# Patient Record
Sex: FEMALE | Race: ASIAN | Employment: UNEMPLOYED | URBAN - METROPOLITAN AREA
[De-identification: names, ages, dates, MRNs, and addresses within clinical notes are randomized per-mention and may not be internally consistent; named-entity substitution may affect disease eponyms.]

---

## 2017-09-24 ENCOUNTER — HOSPITAL ENCOUNTER (INPATIENT)
Age: 47
LOS: 1 days | Discharge: HOME | DRG: 744 | End: 2017-09-26
Attending: EMERGENCY MEDICINE | Admitting: OBSTETRICS & GYNECOLOGY
Payer: SUBSIDIZED

## 2017-09-24 DIAGNOSIS — N93.9 ABNORMAL UTERINE BLEEDING: ICD-10-CM

## 2017-09-24 DIAGNOSIS — I95.9 HYPOTENSION, UNSPECIFIED HYPOTENSION TYPE: ICD-10-CM

## 2017-09-24 DIAGNOSIS — R55 SYNCOPE, UNSPECIFIED SYNCOPE TYPE: ICD-10-CM

## 2017-09-24 DIAGNOSIS — D64.9 SEVERE ANEMIA: Primary | ICD-10-CM

## 2017-09-24 DIAGNOSIS — R57.9 SHOCK (HCC): ICD-10-CM

## 2017-09-24 LAB
ERYTHROCYTE [DISTWIDTH] IN BLOOD BY AUTOMATED COUNT: 18 % (ref 11.6–14.5)
HCT VFR BLD AUTO: 15.4 % (ref 35–45)
HGB BLD-MCNC: 4.8 G/DL (ref 12–16)
MCH RBC QN AUTO: 20.6 PG (ref 24–34)
MCHC RBC AUTO-ENTMCNC: 31.2 G/DL (ref 31–37)
MCV RBC AUTO: 66.1 FL (ref 74–97)
PLATELET # BLD AUTO: 479 K/UL (ref 135–420)
PMV BLD AUTO: 8.7 FL (ref 9.2–11.8)
RBC # BLD AUTO: 2.33 M/UL (ref 4.2–5.3)
WBC # BLD AUTO: 16.1 K/UL (ref 4.6–13.2)

## 2017-09-24 PROCEDURE — 86900 BLOOD TYPING SEROLOGIC ABO: CPT

## 2017-09-24 PROCEDURE — 86920 COMPATIBILITY TEST SPIN: CPT

## 2017-09-24 PROCEDURE — 96360 HYDRATION IV INFUSION INIT: CPT

## 2017-09-24 PROCEDURE — 77030013169 SET IV BLD ICUM -A

## 2017-09-24 PROCEDURE — 80048 BASIC METABOLIC PNL TOTAL CA: CPT | Performed by: EMERGENCY MEDICINE

## 2017-09-24 PROCEDURE — 96361 HYDRATE IV INFUSION ADD-ON: CPT

## 2017-09-24 PROCEDURE — 99285 EMERGENCY DEPT VISIT HI MDM: CPT

## 2017-09-24 PROCEDURE — 85027 COMPLETE CBC AUTOMATED: CPT

## 2017-09-24 RX ORDER — LANOLIN ALCOHOL/MO/W.PET/CERES
CREAM (GRAM) TOPICAL
COMMUNITY
End: 2018-07-23

## 2017-09-24 RX ORDER — PROGESTERONE 200 MG/1
200 CAPSULE ORAL DAILY
COMMUNITY
End: 2017-09-26

## 2017-09-24 RX ORDER — ASPIRIN 81 MG
TABLET, DELAYED RELEASE (ENTERIC COATED) ORAL
COMMUNITY
End: 2017-11-09

## 2017-09-24 NOTE — Clinical Note
Status[de-identified] Inpatient [101] Type of Bed: Medical [8] Inpatient Hospitalization Certified Necessary for the Following Reasons: Patient receiving treatment that can only be provided in an inpatient setting (further clarification in H&P documentation) Admitting Diagnosis: Acute blood loss anemia [865687] Admitting Physician: Lv Gallego Attending Physician: Lv Gallego Estimated Length of Stay: 2 Midnights Discharge Plan[de-identified] Home with Office Follow-up

## 2017-09-24 NOTE — IP AVS SNAPSHOT
Delaney Ortiz 
 
 
 [address] [address] 
[phone] Patient: Yaritza Patricia MRN: [id number] You are allergic to the following No active allergies Recent Documentation Height Weight BMI Smoking Status 1.524 m 43.1 kg 18.55 kg/m2 Never Smoker Unresulted Labs Order Current Status CULTURE, URINE Preliminary result Emergency Contacts Name Discharge Info Relation Home Work Mobile No,One DECLINED CAREGIVER [4] Unknown [9]   [phone] About your hospitalization You were admitted on:  2017 You last received care in the:  [address] [place] [address] You were discharged on:  2017 Unit phone number:  [phone] Why you were hospitalized Your primary diagnosis was:  Cervical Mass Your diagnoses also included:  Vaginal Bleeding Providers Seen During Your Hospitalizations Provider Role Specialty Primary office phone Meng Keith MD Attending Provider Emergency Medicine [phone] Rica Bourgeois MD Attending Provider Obstetrics & Gynecology [phone] Mary Olivera MD Attending Provider Gynecologic Oncology [phone] Your Primary Care Physician (PCP) Primary Care Physician Office Phone Office Fax OTHER, PHYS ** None ** ** None ** Follow-up Information Follow up With Details Comments Contact Info Shama Hilton MD   Patient can only remember the practice name and not the physician Mary Olivera MD Schedule an appointment as soon as possible for a visit in 1 week EXT: 234; PET/CT scan is also ordered to be done prior to follow up. Central scheduling will call to schedule this test. [address] [facility] 
[phone] Your Appointments 2017 CYSTOSCOPY, CERVIX CONE BIOPSY with Mary Olivera MD  
 [facility] [place] [address] [phone] Current Discharge Medication List  
  
START taking these medications Dose & Instructions Dispensing Information Comments Morning Noon Evening Bedtime HYDROmorphone 2 mg tablet Commonly known as:  DILAUDID Your next dose is:  9/26/2017 Dose:  2-4 mg Take 1-2 Tabs by mouth every four (4) hours as needed. Max Daily Amount: 24 mg. Indications: neoplasm related pain Quantity:  60 Tab Refills:  0 CONTINUE these medications which have NOT CHANGED Dose & Instructions Dispensing Information Comments Morning Noon Evening Bedtime  mg Tab Generic drug:  docusate sodium Take  by mouth. Refills:  0  
     
   
   
   
  
 ferrous sulfate 325 mg (65 mg iron) tablet Take  by mouth Daily (before breakfast). Refills:  0 STOP taking these medications   
 progesterone 200 mg capsule Commonly known as:  PROMETRIUM Where to Get Your Medications Information on where to get these meds will be given to you by the nurse or doctor. ! Ask your nurse or doctor about these medications HYDROmorphone 2 mg tablet Discharge Instructions DISCHARGE SUMMARY from Nurse The following personal items are in your possession at time of discharge: 
 
Dental Appliances: None Visual Aid: None Home Medications: None Jewelry: None Clothing: None Other Valuables: None PATIENT INSTRUCTIONS: 
 
 
F-face looks uneven A-arms unable to move or move unevenly S-speech slurred or non-existent T-time-call 911 as soon as signs and symptoms begin-DO NOT go Back to bed or wait to see if you get better-TIME IS BRAIN. Warning Signs of HEART ATTACK Call 911 if you have these symptoms: 
? Chest discomfort. Most heart attacks involve discomfort in the center of the chest that lasts more than a few minutes, or that goes away and comes back. It can feel like uncomfortable pressure, squeezing, fullness, or pain. ? Discomfort in other areas of the upper body. Symptoms can include pain or discomfort in one or both arms, the back, neck, jaw, or stomach. ? Shortness of breath with or without chest discomfort. ? Other signs may include breaking out in a cold sweat, nausea, or lightheadedness. Don't wait more than five minutes to call [address]! Fast action can save your life. Calling 911 is almost always the fastest way to get lifesaving treatment. Emergency Medical Services staff can begin treatment when they arrive  up to an hour sooner than if someone gets to the hospital by car. The discharge information has been reviewed with the patient and spouse. The patient and spouse verbalized understanding. Discharge medications reviewed with the patient and spouse and appropriate educational materials and side effects teaching were provided. Vaginal Bleeding in Nonpregnant Women: Care Instructions Your Care Instructions Many women have bleeding or spotting between periods. Lots of things can cause it. You may bleed because of hormone problems, stress, or ovulation. Fibroids and IUDs (intrauterine devices) can also cause bleeding. If your bleeding or spotting is caused by one of these things and is not heavy or doesn't happen often, you probably don't need to worry. But in rare cases, infection, cancer, or other serious conditions can cause bleeding. So you may need more tests to find the cause of your bleeding. The doctor has checked you carefully, but problems can develop later. If you notice any problems or new symptoms, get medical treatment right away. Follow-up care is a key part of your treatment and safety. Be sure to make and go to all appointments, and call your doctor if you are having problems. It's also a good idea to know your test results and keep a list of the medicines you take. How can you care for yourself at home? · Take pain medicines exactly as directed. ¨ If the doctor gave you a prescription medicine for pain, take it as prescribed. ¨ If you are not taking a prescription pain medicine, ask your doctor if you can take an over-the-counter medicine. Do not take aspirin, which may make bleeding worse. · If your doctor prescribed birth control pills for your bleeding, take them as directed. · Eat foods that are high in iron and vitamin C. Foods high in iron include red meat, shellfish, eggs, beans, and leafy green vegetables. Foods high in vitamin C include citrus fruits, tomatoes, and broccoli. Ask your doctor if you need to take iron pills or a multivitamin. · Ask your doctor when it is okay to have sex. When should you call for help? Call 911 anytime you think you may need emergency care. For example, call if: 
· You passed out (lost consciousness). · You have sudden, severe pain in your belly or pelvis. Call your doctor now or seek immediate medical care if: 
· You have severe vaginal bleeding. You are soaking through your usual pads or tampons each hour for 2 or more hours. · You are dizzy or lightheaded or you feel like you may faint. · You have new belly or pelvic pain. · You have a fever. · Your bleeding gets worse. Watch closely for changes in your health, and be sure to contact your doctor if: 
· You have new or worse vaginal discharge. · You do not get better as expected. Where can you learn more? Go to http://hilary-thelma.info/. Enter H894 in the search box to learn more about \"Vaginal Bleeding in Nonpregnant Women: Care Instructions. \" Current as of: October 13, 2016 Content Version: 11.3 © 8215-9129 [employer]. Care instructions adapted under license by [employer] (which disclaims liability or warranty for this information). If you have questions about a medical condition or this instruction, always ask your healthcare professional. [address] any warranty or liability for your use of this information. Anemia: Care Instructions Your Care Instructions Anemia is a low level of red blood cells, which carry oxygen throughout your body. Many things can cause anemia. Lack of iron is one of the most common causes. Your body needs iron to make hemoglobin, a substance in red blood cells that carries oxygen from the lungs to your body's cells. Without enough iron, the body produces fewer and smaller red blood cells. As a result, your body's cells do not get enough oxygen, and you feel tired and weak. And you may have trouble concentrating. Bleeding is the most common cause of a lack of iron. You may have heavy menstrual bleeding or bleeding caused by conditions such as ulcers, hemorrhoids, or cancer. Regular use of aspirin or other anti-inflammatory medicines (such as ibuprofen) also can cause bleeding in some people. A lack of iron in your diet also can cause anemia, especially at times when the body needs more iron, such as during pregnancy, infancy, and the teen years. Your doctor may have prescribed iron pills. It may take several months of treatment for your iron levels to return to normal. Your doctor also may suggest that you eat foods that are rich in iron, such as meat and beans. There are many other causes of anemia. It is not always due to a lack of iron. Finding the specific cause of your anemia will help your doctor find the right treatment for you. Follow-up care is a key part of your treatment and safety. Be sure to make and go to all appointments, and call your doctor if you are having problems. It's also a good idea to know your test results and keep a list of the medicines you take. How can you care for yourself at home? · Take your medicines exactly as prescribed. Call your doctor if you think you are having a problem with your medicine. · If your doctor recommends iron pills, take them as directed: ¨ Try to take the pills on an empty stomach about 1 hour before or 2 hours after meals. But you may need to take iron with food to avoid an upset stomach. ¨ Do not take antacids or drink milk or caffeine drinks (such as coffee, tea, or cola) at the same time or within 2 hours of the time that you take your iron. They can make it hard for your body to absorb the iron. ¨ Vitamin C (from food or supplements) helps your body absorb iron. Try taking iron pills with a glass of orange juice or some other food that is high in vitamin C, such as citrus fruits. ¨ Iron pills may cause stomach problems, such as heartburn, nausea, diarrhea, constipation, and cramps. Be sure to drink plenty of fluids, and include fruits, vegetables, and fiber in your diet each day. Iron pills often make your bowel movements dark or green. ¨ If you forget to take an iron pill, do not take a double dose of iron the next time you take a pill. ¨ Keep iron pills out of the reach of small children. An overdose of iron can be very dangerous. · Follow your doctor's advice about eating iron-rich foods. These include red meat, shellfish, poultry, eggs, beans, raisins, whole-grain bread, and leafy green vegetables. · Steam vegetables to help them keep their iron content. When should you call for help? Call 911 anytime you think you may need emergency care. For example, call if: 
· You have symptoms of a heart attack. These may include: ¨ Chest pain or pressure, or a strange feeling in the chest. 
¨ Sweating. ¨ Shortness of breath. ¨ Nausea or vomiting. ¨ Pain, pressure, or a strange feeling in the back, neck, jaw, or upper belly or in one or both shoulders or arms. ¨ Lightheadedness or sudden weakness. ¨ A fast or irregular heartbeat. After you call 911, the  may tell you to chew 1 adult-strength or 2 to 4 low-dose aspirin. Wait for an ambulance. Do not try to drive yourself. · You passed out (lost consciousness). Call your doctor now or seek immediate medical care if: 
· You have new or increased shortness of breath. · You are dizzy or lightheaded, or you feel like you may faint. · Your fatigue and weakness continue or get worse. · You have any abnormal bleeding, such as: 
¨ Nosebleeds. ¨ Vaginal bleeding that is different (heavier, more frequent, at a different time of the month) than what you are used to. ¨ Bloody or black stools, or rectal bleeding. ¨ Bloody or pink urine. Watch closely for changes in your health, and be sure to contact your doctor if: 
· You do not get better as expected. Where can you learn more? Go to http://hilary-thelma.info/. Enter R301 in the search box to learn more about \"Anemia: Care Instructions. \" Current as of: October 13, 2016 Content Version: 11.3 © 3151-6005 [employer]. Care instructions adapted under license by [employer] (which disclaims liability or warranty for this information). If you have questions about a medical condition or this instruction, always ask your healthcare professional. [place] [address] any warranty or liability for your use of this information. Patient armband removed and shredded. Discharge Orders None Anzu Announcement We are excited to announce that we are making your provider's discharge notes available to you in Anzu.   You will see these notes when they are completed and signed by the physician that discharged you from your recent hospital stay. If you have any questions or concerns about any information you see in Helijia, please call the Health Information Department where you were seen or reach out to your Primary Care Provider for more information about your plan of care. Introducing \[facility] [place] [facility]\"" & HEALTH SERVICES! Cathleen Berkowitz introduces Helijia patient portal. Now you can access parts of your medical record, email your doctor's office, and request medication refills online. 1. In your internet browser, go to [email or web address] 2. Click on the First Time User? Click Here link in the Sign In box. You will see the New Member Sign Up page. 3. Enter your Helijia Access Code exactly as it appears below. You will not need to use this code after youve completed the sign-up process. If you do not sign up before the expiration date, you must request a new code. · Helijia Access Code: [email or web address] Expires: 12/25/2017  5:34 PM 
 
4. Enter the last four digits of your Social Security Number (xxxx) and Date of Birth (mm/dd/yyyy) as indicated and click Submit. You will be taken to the next sign-up page. 5. Create a Helijia ID. This will be your Helijia login ID and cannot be changed, so think of one that is secure and easy to remember. 6. Create a Helijia password. You can change your password at any time. 7. Enter your Password Reset Question and Answer. This can be used at a later time if you forget your password. 8. Enter your e-mail address. You will receive e-mail notification when new information is available in [address] 9. Click Sign Up. You can now view and download portions of your medical record. 10. Click the Download Summary menu link to download a portable copy of your medical information.  
 
If you have questions, please visit the Frequently Asked Questions section of the [employer]. Remember, MyChart is NOT to be used for urgent needs. For medical emergencies, dial 911. Now available from your iPhone and Android! General Information Please provide this summary of care documentation to your next provider. Patient Signature:  ____________________________________________________________ Date:  ____________________________________________________________  
  
Rexann Ports Provider Signature:  ____________________________________________________________ Date:  ____________________________________________________________

## 2017-09-24 NOTE — IP AVS SNAPSHOT
[facility] 
 
 
 [address] [address] 
[phone] Patient: Angelica Henderson MRN: [id number] GOV:[birth year] Current Discharge Medication List  
  
START taking these medications Dose & Instructions Dispensing Information Comments Morning Noon Evening Bedtime HYDROmorphone 2 mg tablet Commonly known as:  DILAUDID Your next dose is:  9/26/2017 Dose:  2-4 mg Take 1-2 Tabs by mouth every four (4) hours as needed. Max Daily Amount: 24 mg. Indications: neoplasm related pain Quantity:  60 Tab Refills:  0 CONTINUE these medications which have NOT CHANGED Dose & Instructions Dispensing Information Comments Morning Noon Evening Bedtime  mg Tab Generic drug:  docusate sodium Take  by mouth. Refills:  0  
     
   
   
   
  
 ferrous sulfate 325 mg (65 mg iron) tablet Take  by mouth Daily (before breakfast). Refills:  0 STOP taking these medications   
 progesterone 200 mg capsule Commonly known as:  PROMETRIUM Where to Get Your Medications Information on where to get these meds will be given to you by the nurse or doctor. ! Ask your nurse or doctor about these medications HYDROmorphone 2 mg tablet

## 2017-09-25 ENCOUNTER — APPOINTMENT (OUTPATIENT)
Dept: CT IMAGING | Age: 47
DRG: 744 | End: 2017-09-25
Attending: OBSTETRICS & GYNECOLOGY
Payer: SUBSIDIZED

## 2017-09-25 PROBLEM — D62 ACUTE BLOOD LOSS ANEMIA: Status: ACTIVE | Noted: 2017-09-25

## 2017-09-25 PROBLEM — N93.9 VAGINAL BLEEDING: Status: ACTIVE | Noted: 2017-09-25

## 2017-09-25 PROBLEM — N93.9 VAGINAL BLEEDING: Status: RESOLVED | Noted: 2017-09-25 | Resolved: 2017-09-25

## 2017-09-25 LAB
ANION GAP SERPL CALC-SCNC: 8 MMOL/L (ref 3–18)
BUN SERPL-MCNC: 7 MG/DL (ref 7–18)
BUN/CREAT SERPL: 11 (ref 12–20)
CALCIUM SERPL-MCNC: 8.6 MG/DL (ref 8.5–10.1)
CHLORIDE SERPL-SCNC: 102 MMOL/L (ref 100–108)
CO2 SERPL-SCNC: 28 MMOL/L (ref 21–32)
CREAT SERPL-MCNC: 0.64 MG/DL (ref 0.6–1.3)
ERYTHROCYTE [DISTWIDTH] IN BLOOD BY AUTOMATED COUNT: 19.9 % (ref 11.6–14.5)
EST. AVERAGE GLUCOSE BLD GHB EST-MCNC: 148 MG/DL
GLUCOSE BLD STRIP.AUTO-MCNC: 122 MG/DL (ref 70–110)
GLUCOSE SERPL-MCNC: 158 MG/DL (ref 74–99)
HBA1C MFR BLD: 6.8 % (ref 4.5–5.6)
HCT VFR BLD AUTO: 39.1 % (ref 35–45)
HGB BLD-MCNC: 13.6 G/DL (ref 12–16)
MCH RBC QN AUTO: 26.9 PG (ref 24–34)
MCHC RBC AUTO-ENTMCNC: 34.8 G/DL (ref 31–37)
MCV RBC AUTO: 77.4 FL (ref 74–97)
PLATELET # BLD AUTO: 263 K/UL (ref 135–420)
PMV BLD AUTO: 9.5 FL (ref 9.2–11.8)
POTASSIUM SERPL-SCNC: 3.7 MMOL/L (ref 3.5–5.5)
RBC # BLD AUTO: 5.05 M/UL (ref 4.2–5.3)
SODIUM SERPL-SCNC: 138 MMOL/L (ref 136–145)
WBC # BLD AUTO: 20.2 K/UL (ref 4.6–13.2)

## 2017-09-25 PROCEDURE — 36415 COLL VENOUS BLD VENIPUNCTURE: CPT | Performed by: OBSTETRICS & GYNECOLOGY

## 2017-09-25 PROCEDURE — 87086 URINE CULTURE/COLONY COUNT: CPT | Performed by: OBSTETRICS & GYNECOLOGY

## 2017-09-25 PROCEDURE — 30233N1 TRANSFUSION OF NONAUTOLOGOUS RED BLOOD CELLS INTO PERIPHERAL VEIN, PERCUTANEOUS APPROACH: ICD-10-PCS | Performed by: OBSTETRICS & GYNECOLOGY

## 2017-09-25 PROCEDURE — 74177 CT ABD & PELVIS W/CONTRAST: CPT

## 2017-09-25 PROCEDURE — 77010033678 HC OXYGEN DAILY

## 2017-09-25 PROCEDURE — 88305 TISSUE EXAM BY PATHOLOGIST: CPT | Performed by: OBSTETRICS & GYNECOLOGY

## 2017-09-25 PROCEDURE — 88304 TISSUE EXAM BY PATHOLOGIST: CPT | Performed by: OBSTETRICS & GYNECOLOGY

## 2017-09-25 PROCEDURE — 74011250636 HC RX REV CODE- 250/636

## 2017-09-25 PROCEDURE — P9016 RBC LEUKOCYTES REDUCED: HCPCS

## 2017-09-25 PROCEDURE — 74011636320 HC RX REV CODE- 636/320: Performed by: OBSTETRICS & GYNECOLOGY

## 2017-09-25 PROCEDURE — 82962 GLUCOSE BLOOD TEST: CPT

## 2017-09-25 PROCEDURE — 83036 HEMOGLOBIN GLYCOSYLATED A1C: CPT | Performed by: OBSTETRICS & GYNECOLOGY

## 2017-09-25 PROCEDURE — 74011250636 HC RX REV CODE- 250/636: Performed by: OBSTETRICS & GYNECOLOGY

## 2017-09-25 PROCEDURE — 74011250636 HC RX REV CODE- 250/636: Performed by: EMERGENCY MEDICINE

## 2017-09-25 PROCEDURE — 65270000029 HC RM PRIVATE

## 2017-09-25 PROCEDURE — 36430 TRANSFUSION BLD/BLD COMPNT: CPT

## 2017-09-25 PROCEDURE — 85027 COMPLETE CBC AUTOMATED: CPT | Performed by: OBSTETRICS & GYNECOLOGY

## 2017-09-25 RX ORDER — SODIUM CHLORIDE 9 MG/ML
250 INJECTION, SOLUTION INTRAVENOUS AS NEEDED
Status: DISCONTINUED | OUTPATIENT
Start: 2017-09-25 | End: 2017-09-26 | Stop reason: HOSPADM

## 2017-09-25 RX ORDER — HYDROMORPHONE HYDROCHLORIDE 2 MG/ML
0.5 INJECTION, SOLUTION INTRAMUSCULAR; INTRAVENOUS; SUBCUTANEOUS
Status: DISCONTINUED | OUTPATIENT
Start: 2017-09-25 | End: 2017-09-26 | Stop reason: HOSPADM

## 2017-09-25 RX ORDER — HYDROMORPHONE HYDROCHLORIDE 2 MG/ML
INJECTION, SOLUTION INTRAMUSCULAR; INTRAVENOUS; SUBCUTANEOUS
Status: COMPLETED
Start: 2017-09-25 | End: 2017-09-25

## 2017-09-25 RX ORDER — DIPHENHYDRAMINE HYDROCHLORIDE 50 MG/ML
50 INJECTION, SOLUTION INTRAMUSCULAR; INTRAVENOUS
Status: DISCONTINUED | OUTPATIENT
Start: 2017-09-25 | End: 2017-09-26

## 2017-09-25 RX ORDER — SODIUM CHLORIDE 9 MG/ML
250 INJECTION, SOLUTION INTRAVENOUS AS NEEDED
Status: DISCONTINUED | OUTPATIENT
Start: 2017-09-25 | End: 2017-09-25 | Stop reason: SDUPTHER

## 2017-09-25 RX ORDER — SODIUM CHLORIDE 9 MG/ML
250 INJECTION, SOLUTION INTRAVENOUS AS NEEDED
Status: DISCONTINUED | OUTPATIENT
Start: 2017-09-25 | End: 2017-09-26

## 2017-09-25 RX ORDER — ACETAMINOPHEN 10 MG/ML
1000 INJECTION, SOLUTION INTRAVENOUS EVERY 6 HOURS
Status: DISCONTINUED | OUTPATIENT
Start: 2017-09-25 | End: 2017-09-26

## 2017-09-25 RX ADMIN — SODIUM CHLORIDE 250 ML: 900 INJECTION, SOLUTION INTRAVENOUS at 01:25

## 2017-09-25 RX ADMIN — IOPAMIDOL 100 ML: 612 INJECTION, SOLUTION INTRAVENOUS at 11:16

## 2017-09-25 RX ADMIN — SODIUM CHLORIDE 1000 ML: 900 INJECTION, SOLUTION INTRAVENOUS at 00:40

## 2017-09-25 RX ADMIN — DIATRIZOATE MEGLUMINE AND DIATRIZOATE SODIUM 30 ML: 600; 100 SOLUTION ORAL; RECTAL at 09:50

## 2017-09-25 RX ADMIN — ACETAMINOPHEN 1000 MG: 10 INJECTION, SOLUTION INTRAVENOUS at 16:36

## 2017-09-25 RX ADMIN — ACETAMINOPHEN 1000 MG: 10 INJECTION, SOLUTION INTRAVENOUS at 21:55

## 2017-09-25 RX ADMIN — ACETAMINOPHEN 1000 MG: 10 INJECTION, SOLUTION INTRAVENOUS at 03:48

## 2017-09-25 RX ADMIN — ACETAMINOPHEN 1000 MG: 10 INJECTION, SOLUTION INTRAVENOUS at 09:42

## 2017-09-25 RX ADMIN — HYDROMORPHONE HYDROCHLORIDE 0.5 MG: 2 INJECTION, SOLUTION INTRAMUSCULAR; INTRAVENOUS; SUBCUTANEOUS at 16:31

## 2017-09-25 NOTE — DIABETES MGMT
GLYCEMIC CONTROL & NUTRITION:    - Discussed in rounds, no documented h/o DM  - noted glucose on labs elevated last night, A1C , would be better to run off labs drawn last night, will be inaccurate after transfusion  - will continue to monitor  - may benefit from adding POCT + Humalog corrective coverage      Recent Glucose Results:   Lab Results   Component Value Date/Time     (H) 09/24/2017 11:00 PM       LINDA Dee, MPH, RD, CDE

## 2017-09-25 NOTE — ED NOTES
Pt tolerating transfusion well. VSS. No s/sx of transfusion reaction. Pt is awake and alert. Call bell within reach, rails up for safety. Family at bedside.

## 2017-09-25 NOTE — INTERDISCIPLINARY ROUNDS
CRITICAL CARE INTERDISCIPLINARY ROUNDS    Patient Information:    Name:   Dallin Douglas    Age:   49 y.o. Admission Date:   9/24/2017    Critical Care Day:  1    Surgery Date:      Attending Provider:   Ella Lopez MD    Surgeon:   Sasha Robertson):   n/a    Critical Care Physician:  Karla Purdy (not on case)    Code Status: No Order    Problem List:     Patient Active Problem List   Diagnosis Code    Vaginal bleeding N93.9       Principal Problem:  <principal problem not specified>    During rounds the following quality care indicators and evidence based practices were addressed :  SCD & PUD Prophylaxis Jay Day 1 ([facility] y) ; Central Line Day:na Isolation:na; Antibiotic Stewardship: na; Probiotics Necessary: na    Ventilator Bundle:      Sepsis Order Set:     Glycemic Control:   Recent Labs      09/24/17   2300   GLU  158*   ; No results for input(s): PHI, PCO2I, PO2I in the last 72 hours. No lab exists for component: 3 Adjustments     Acute MI/PCI:   Not applicable    Door to Balloon: Admission Time: 2225      Heart Failure:    Not applicable     SCIP Measures for other Surgeries:   Not applicable     Pneumonia:    Not applicable    Interdisciplinary team rounds were held with the following team membersDiabetes Treatment Specialist, Nursing, Nutrition, Pastoral Care, Pharmacy, Physician and Respiratory Therapy. Plan of care discussed. Goals of Care/ Recommendations: Transfusion in process, for CT today, and OR tomorrow, add HGb a1c to last night labs,     See clinical pathway and/or care plan for interventions and desired outcomes.     Critical Care Discharge Status:  Red

## 2017-09-25 NOTE — ED NOTES
Presented to ED with family to be evaluated for reported vaginal bleeding x 2 weeks over the past 3 months. Family reports irregular menses with syncopal episode during the time of vaginal bleeding. Family reports seen by OB/GYN with follow-up on 9/29.

## 2017-09-25 NOTE — H&P
Gynecology History and Physical    Name: Maddi Cooper MRN: [id number] SSN: [id number]    YOB: 1970  Age: 49 y.o. Sex: female       Subjective:      Chief complaint: Vaginal bleeding with acute blood loss anemia    Vu is a 49 y.o.  female with a history of vaginal bleeding for 3 months now who presents to the ER with significant vaginal bleeding, passing clots, dizziness, and near vasovagal episodes. Pt. Saw a gynecologic nurse practitioner on September 19th for this vaginal bleeding,  and had a follow-up appointment with her scheduled this upcoming week. In the meantime she was started on Progesterone and Iron. Of note it seems that the patient has not seen a gynecologist in a very long time and has had a recent weight loss of a moderate amount of weight per the family. At the ER tonight, the patient was found to have a hemoglobin of 4.8. A blood transfusion was started immediately and continued to bleed during my speculum exam She was ultimately found to have a fungating cervical mass >4cms which appeared to be extending into her left pelvic side wall and continued to actively bleed. It was decided not to obtain a bedside biopsy due to risk of worsening the bleeding she was already having. Instead, vaginal packing with 1/4 inch iodoform was placed tightly into her vaginal vault in hopes of tamponading the cervical mass. A joseph catheter was inserted to enable urination. I explained the suspected findings with the patient and her  and informed them that we were going to admit her to the ICU overnight and Jenny Badillo from GYN oncology would see them tomorrow. I also explained that the patient is to remain NPO for the possibility of an EUA with biopsies by Keshav Badillo if she feels is necessary tomorrow. I have ordered a total of five units of blood to be transfused along with IV tylenol and Benadryl.  Pt. Did complain of cramping after the vaginal packing was placed which was controlled with the IV Tylenol and a narcotic was ordered on standby but not initially given considering the patient's hypotensive status. Total blood loss at the bedside was approximately 200 cc with large clots. OB History     No data available        History reviewed. No pertinent past medical history. History reviewed. No pertinent surgical history. Social History     Occupational History    Not on file. Social History Main Topics    Smoking status: Never Smoker    Smokeless tobacco: Never Used    Alcohol use No    Drug use: Not on file    Sexual activity: Not on file     History reviewed. No pertinent family history. No Known Allergies  Prior to Admission medications    Medication Sig Start Date End Date Taking? Authorizing Provider   docusate sodium (DOK) 100 mg tab Take  by mouth. Yes Shama Hilton MD   progesterone (PROMETRIUM) 200 mg capsule Take 200 mg by mouth daily. Yes Shama Hilton MD   ferrous sulfate 325 mg (65 mg iron) tablet Take  by mouth Daily (before breakfast). Yes Shama Hilton MD        Review of Systems:  A comprehensive review of systems was negative except for that written in the History of Present Illness. Objective:     Vitals:    09/25/17 0200 09/25/17 0230 09/25/17 0300 09/25/17 0315   BP: 90/47 91/52 99/52 95/57   Pulse: 83 89 82 90   Resp: 19 16 23 22   Temp: 98.4 °F (36.9 °C) 98.5 °F (36.9 °C)     SpO2: 100% 100% 100% 100%   Weight:       Height:             Assessment:     Cervical mass extending into left pelvic side wall causing profuse vaginal bleeding with acute blood loss anemia.     Plan:     Admit to ICU  Continue with Transfusion  Consultation to Jed Tyson in am      Signed By:  Larissa Carlin MD     September 25, 2017

## 2017-09-25 NOTE — CONSULTS
[facility] GYNECOLOGIC ONCOLOGY   [address] [facility], [address] [facility]  [phone]  Cristi Loredo MD      Patient ID:  Name:  Josiane Villalta  MRN:  [id number]  :  1970/47 y.o. Date:  2017    REFERRING PROVIDER:  Dago Lau MD    HISTORY OF PRESENT ILLNESS:  Josiane Villalta is a 49 y.o.   female admitted via ED by Dr. Juan Harry overnight with heavy vaginal bleeding, severe anemia, and hypotension. Blood transfusion is in progress. Hemodynamic status has improved. Examination by Dr. Juan Harry identified a fungating cervical mass with apparent extension to the left pelvic sidewall. No biopsies were performed due to already heavy bleeding and anemia. Vaginal packing is in place. PATHOLOGY:  None     LABS:  Hgb 4.8  WBC 16.1  Creatinine 0.64    IMAGING:  None to date    COMPREHENSIVE ROS:  Constitutional: Weight Change and Fatigue  Skin: neg  Allergy/Imm: neg  Eyes/Head: Dizziness  Ears/Nose/Mouth: neg  Respiratory: neg  Cardiovascular:near syncope  Gastrointestinal: Nausea/Vomiting and Abdominal Pain  Genito-Urinary: Vaginal Discharge/Spotting and Urgency  Endocrine: neg  Muscular System:Back Pain  Neuro: neg  Psych: neg  Blood/Lymph: Anemia  All other systems reviewed and are negative. PROBLEM LIST:                Patient Active Problem List    Diagnosis Date Noted    Vaginal bleeding 2017           Family Hx:              History reviewed. No pertinent family history. Social Hx:                Social History   Substance Use Topics    Smoking status: Never Smoker    Smokeless tobacco: Never Used    Alcohol use No       Surgical Hx:              History reviewed. No pertinent surgical history. Past Medical Hx:              History reviewed. No pertinent past medical history.     Medications:     Current Facility-Administered Medications   Medication Dose Route Frequency    0.9% sodium chloride infusion 250 mL  250 mL IntraVENous PRN    0.9% sodium chloride infusion 250 mL  250 mL IntraVENous PRN    acetaminophen (OFIRMEV) infusion 1,000 mg  1,000 mg IntraVENous Q6H    0.9% sodium chloride infusion 250 mL  250 mL IntraVENous PRN    diphenhydrAMINE (BENADRYL) injection 50 mg  50 mg IntraVENous Q4H PRN       Allergies:   No Known Allergies      OBJECTIVE:    Physical Exam  VITAL SIGNS: Visit Vitals    BP 94/54    Pulse 84    Temp 97.6 °F (36.4 °C)    Resp 19    Ht 5' (1.524 m)    Wt 43.1 kg (95 lb)    LMP 09/12/2017 (Approximate)    SpO2 100%    BMI 18.55 kg/m2      GENERAL GAVINO: in no apparent distress and well developed and well nourished   HEENT: NCAT,EOMI,PERRL   RESPIRATORY: lungs clear to auscultation, no wheezing, rhonchi, or crackles   CARDIOVASC: Regular rate and rhythm or without murmur or extra heart sounds   GASTROINT: soft, non-tender, non-distended, without masses or organomegaly, normal active bowel sounds   MUSCULOSKEL: no joint tenderness, deformity or swelling   INTEGUMENT:  warm and dry, no rashes or lesions   EXTREMITIES: extremities normal, atraumatic, no cyanosis or edema   PELVIC: Exam deferred. Packing in place in the vagina. Pelvic performed this am by Dr. Alannah Fuentes with large fungating bleeding cervical mass identified. Will defer exam until OR   RECTAL: deferred   ALBERT SURVEY: Cervical, supraclavicular, axillary and inguinal nodes normal.   NEURO: Grossly normal         IMPRESSION/PLAN:  Large cervical mass highly suspicious for primary cervical cancer. Will obtain CT scan of abdomen and pelvis with IV and oral contrast.   Will schedule EUA/Cysto/Procto with biopsies tomorrow for definitive diagnosis and staging. Acute on chronic blood loss anemia with active bleeding. Packing in place with apparent tamponade of bleeding currently. Transfusion in progress. 5 units total ordered. Elevated serum glucose. Patient denies any prior diagnosis. Will order HgbA1C with next blood draw. Aretha Roman.  Kwabena Mcelroy MD  Gynecologic Oncology  7/61/23728:32 AM

## 2017-09-25 NOTE — ED NOTES
MD in room to perform pelvic exam, RN in room to assist. Pt has active vaginal bleeding in large amounts, MD placing packing into vaginal canal to help stop bleeding at this time. Pt tolerating well, c/o pelvic cramping. VSS at this time, monitoring closely.

## 2017-09-25 NOTE — ED NOTES
Pt resting in bed quietly. Awake and alert. Family at bedside with her. VSS at this time but closely being monitored. Call bell within reach. Rails up for safety.

## 2017-09-25 NOTE — PROGRESS NOTES
Readmission Risk Assessment: Low Risk and MSSP/Good Help ACO patients    RRAT Score: 1 - 12    Initial Assessment:Emergency Contact: chart reviewed pt came to ED with c/o vag bleeding for several weeks,patient has gyn on case,APA referral placed due to no medical insurance provided,cm will follow up with d/c planning with patient when more stable , blood transfusing has been ordered. Pertinent Medical Hx: see chart  PCP/Specialists: Community Services: non listed. DME:     Low Risk Care Transition Plan:  1. Evaluate for [facility] or [address] coordination of resources  2. Involve patient/caregiver in assessment, planning, education and implement of intervention. 3. CM daily patient care huddles/interdisciplinary rounds. 4. PCP/Specialist appointment within 7 - 10 days made prior to discharge. 5. Facilitate transportation and logistics for follow-up appointments. 6. Handoff to [address] Nurse Navigator or PCP practice.

## 2017-09-25 NOTE — PROGRESS NOTES
conducted an initial consultation and Spiritual Assessment for Rosalba Shultz, who is a 49 y.o.,female. According to the patients chart Catholic Affiliation is: [place]. Patient awake and alert with a smile on her face. Daughter at the bedside.  had just gone home to get some rest. All speak some English. Blue phone in the room. Good support from family. Waiting to hear about surgery. The reason the Patient came to the hospital is:   Patient Active Problem List    Diagnosis Date Noted    Vaginal bleeding 09/25/2017        The  provided the following Interventions:  Initiated a relationship of care and support. Listened empathically. Provided information about Spiritual Care Services. Offered assurance of continued prayers on patients behalf. Chart reviewed. The following outcomes were achieved:   confirmed Patient's Catholic Affiliation. Patient expressed gratitude for Spiritual Care visit. Patient was reviewed in ICU Interdisciplinary Rounds. Assessment:  There are no significant spiritual or Taoism issues which require further intervention at this time. Patient does not have any Taoism or cultural needs that will affect patients preferences in health care. Plan:  Chaplains will continue to follow and will provide pastoral care as needed or requested.  recommends bedside caregivers page  on duty if patient shows signs of acute spiritual or emotional distress. [address], M.Div.   Board Certified   [phone] - Office

## 2017-09-25 NOTE — ED NOTES
First unit of blood complete. Pt tolerated well. VSS during treatment with no s/sx of transfusion reaction. She is resting in bed, awake and alert. Family at bedside with her. Call bell within reach. Rails up for safety. Vaginal bleeding has stabilized upon assessment of area. Absorbent pads under patient are free of any bleeding.

## 2017-09-25 NOTE — ED PROVIDER NOTES
[address]  EMERGENCY DEPARTMENT HISTORY AND PHYSICAL EXAM       Date: 9/24/2017   Patient Name: Dallin Douglas   YOB: 1970  Medical Record Number: [id number]    History of Presenting Illness     Chief Complaint   Patient presents with    Vaginal Bleeding        History Provided By:  patient    Additional History:   10:56 PM    Dallin Douglas is a 49 y.o. female presenting ambulatory to the ED c/o worsening vaginal bleeding, onset \"a couple weeks ago. \" Associated symptoms include HA, dizziness, and cramping abdominal pain. Pt was seen at GYN on 9/19/17 and today, given medication, and to follow up on 9/29/17. Pt specifically denies any other sxs or complaints at this time. Past History     Past Medical History:   History reviewed. No pertinent past medical history. Past Surgical History:   History reviewed. No pertinent surgical history. Family History:   History reviewed. No pertinent family history. Social History:   Social History   Substance Use Topics    Smoking status: Never Smoker    Smokeless tobacco: Never Used    Alcohol use No        Allergies:   No Known Allergies     Review of Systems   Review of Systems   Constitutional: Positive for fatigue. Negative for diaphoresis and fever. Respiratory: Negative for shortness of breath. Cardiovascular: Negative for chest pain. Gastrointestinal: Positive for abdominal pain (cramping). Genitourinary: Positive for vaginal bleeding and vaginal pain (with passing of clots). Negative for difficulty urinating and dysuria. Neurological: Positive for dizziness, weakness (intermittent past 2 weeks) and headaches. All other systems reviewed and are negative.       Physical Exam  Vitals:    09/26/17 1355 09/26/17 1400 09/26/17 1419 09/26/17 1530   BP: 99/46 107/57 115/59 107/60   Pulse:  72 76 90   Resp:  16 14 20   Temp:   97.3 °F (36.3 °C) 97.6 °F (36.4 °C)   SpO2:  98% 100% 99%   Weight:       Height: Physical Exam   Constitutional: She is oriented to person, place, and time. Vital signs are normal. She appears well-developed and well-nourished. No distress. Pt lying in bed, appears comfortable   HENT:   Head: Normocephalic and atraumatic. Eyes: Conjunctivae and EOM are normal. Pupils are equal, round, and reactive to light. Neck: Normal range of motion. Neck supple. Cardiovascular: Normal rate, regular rhythm and normal heart sounds. Pulmonary/Chest: Effort normal and breath sounds normal. No respiratory distress. She has no wheezes. She has no rales. She exhibits no tenderness. Abdominal: Soft. Normal appearance and bowel sounds are normal. She exhibits no distension and no mass. There is no tenderness. There is no rigidity, no rebound and no guarding. Genitourinary:   Genitourinary Comments: No vaginal assessment performed; pts  states she saw 4 days prior with vaginal exam. Of note, a large (golfball+ size dark clot expelled vaginally from pt while lying in bed was visualized and sent to pathology)   Musculoskeletal: Normal range of motion. Neurological: She is alert and oriented to person, place, and time. Skin: Skin is warm and dry. Rash: pt with mild pallor. She is not diaphoretic. There is pallor. Psychiatric: She has a normal mood and affect. Her behavior is normal.   Nursing note and vitals reviewed. Diagnostic Study Results     Labs -      No results found for this or any previous visit (from the past 12 hour(s)). Radiologic Studies -  CT ABD PELV W CONT   Final Result           Medical Decision Making   I am the first provider for this patient. I reviewed the vital signs, available nursing notes, past medical history, past surgical history, family history and social history. Vital Signs-Reviewed the patient's vital signs. No data found. Pulse Oximetry Analysis - Normal 100% on RA. No intervention needed.        Old Medical Records: Nursing notes.     ED Course:     10:56 PM - Initial assessment performed. The patients presenting problems have been discussed, and they are in agreement with the care plan formulated and outlined with them. I have encouraged them to ask questions as they arise throughout their visit. CONSULT NOTE:  Ángel Knight PA-C spoke with Dora Thomas MD,  Specialty: Hospitalist  Discussed pt's hx, disposition, and available diagnostic and imaging results. Reviewed care plans. Consultant agrees with plans as outlined. He advises calling the OB/GYN before admission. 11: 43 PM- Pts blood work partially resulted-- Hgb 4.8, Hct 15, labs discussed with pts daughter (language barrier, though expressed understanding). Type & Screen added, will consult with Dr. Geraldine Weaver and Dr. Abdulkadir Vincent, per Dr. Sri Main and order 2 units PRBC's    12:07 PM - Pt passed a large golf ball sized clot. Followed by multiple in bathroom where she ambulated with daughter (per staff report). BEDSIDE SIGN OUT:  12:09 AM  Discussed pt's hx, disposition, and available diagnostic and imaging results with [facility]. Dennis Townsend MD at bedside with the patient. Reviewed care plans. Both providers and patient ar e in agreement with care plan. Tosin Burger PA-C is transferring care of the pt to [facility]. Dennis Townsend MD at this time. Written by Claudia Moreno ED Scribe, as dictated by Amara Townsend MD.    CONSULT NOTE:  2:01 AM  [facility] Dennis Townsend MD spoke with Dr. Fabienne Adams,  Specialty: OB/GYN  Discussed pt's hx, disposition, and available diagnostic and imaging results. Reviewed care plans. Consultant agrees with plans as outlined. She states that she will come to the ED and will admit the pt to ICU. Written by So Del Cid ED Scribe, as dictated by Amara Townsend MD.     Medications Given in the ED:  Medications   sodium chloride 0.9 % bolus infusion 1,000 mL (0 mL IntraVENous IV Completed 9/25/17 0115)   diatrizoate meglumine-d.sodium (MD-GASTROVIEW,GASTROGRAFIN) 66-10 % contrast solution 30 mL (30 mL Oral Given 9/25/17 0950)   iopamidol (ISOVUE 300) 61 % contrast injection 100 mL (100 mL IntraVENous Given 9/25/17 1116)   phenazopyridine (PYRIDIUM) tablet 100 mg (100 mg Oral Given 9/26/17 0834)   cefOXitin (MEFOXIN) 2 g in 0.9% sodium chloride (MBP/ADV) 100 mL MBP (2 g IntraVENous New Bag 9/26/17 0934)        2:11 AM  Patient is being admitted to the hospital by Dr. Nevin Beasley. The results of their tests and reasons for their admission have been discussed with them and/or available family. They convey agreement and understanding for the need to be admitted and for their admission diagnosis. Diagnosis   Clinical Impression:   1. Severe anemia    2. Syncope, unspecified syncope type    3. Abnormal uterine bleeding    4. Shock ([address]    5. Hypotension, unspecified hypotension type       ADMIT TO ICU    12:00 AM  I have spent 60 minutes of critical care time involved in lab review, consultations with specialist, family decision-making, and documentation. During this entire length of time I was immediately available to the patient. Critical Care: The reason for providing this level of medical care for this critically ill patient was due a critical illness that impaired one or more vital organ systems such that there was a high probability of imminent or life threatening deterioration in the patients condition. This care involved high complexity decision making to assess, manipulate, and support vital system functions, to treat this degreee vital organ system failure and to prevent further life threatening deterioration of the patients condition.     _______________________________   Attestations:     SCRIBE ATTESTATION:  This note is prepared by [employer], acting as Scribe for Cristel Donovan PA-C.    PROVIDER ATTESTATION:  Cristel Donovan PA-C: The scribe's documentation has been prepared under my direction and personally reviewed by me in its entirety. I confirm that the note above accurately reflects all work, treatment, procedures, and medical decision making performed by me. Attestation: This note is prepared in-part by Jillian Nicholson, acting as Scribe for Marina Butler MD, after sign out note (bedside transfer of care) above.     Marina Butler MD: The scribe's documentation has been prepared under my direction and personally reviewed by me in its entirety. I confirm that the note above accurately reflects all work, treatment, procedures, and medical decision making performed by me.     _______________________________         I personally saw and examined the patient. I have reviewed and agree with the MLP's findings, including all diagnostic interpretations, and plans as written. I was present during the key portions of separately billed procedures.     Jesús Thornton MD

## 2017-09-25 NOTE — ED NOTES
Pt resting in bed quitely. Awake and alert. VSS at this time, closely monitoring. Call bell within reach. Family at bedside.

## 2017-09-25 NOTE — ED NOTES
PRBC unit scanned has leak in bag upon close inspection, unit returned to blood bank immediately. Transfusion was never started prior to discovery, patient was not exposed to any contents of this unit. Kirill Kauffman RN in room as 2nd RN witness. Second unit order released to receive another unit of blood immediately. MD notified of return/waste of first unit, orders to be placed for additional unit to complete initial order of 2 units. Bloodbank aware of situation and will be processing another unit of blood. Monique Vo RN in room as second RN witness for administration.

## 2017-09-25 NOTE — PROGRESS NOTES
Problem: Falls - Risk of  Goal: *Absence of Falls  Document Pete Fall Risk and appropriate interventions in the flowsheet.    Outcome: Progressing Towards Goal  Fall Risk Interventions:  Mobility Interventions: Bed/chair exit alarm                 Elimination Interventions: Call light in reach, Patient to call for help with toileting needs, Toileting schedule/hourly rounds

## 2017-09-25 NOTE — PROGRESS NOTES
Patient complained of pain through daughter translating. Reports lower abd pain 6/10. No prn meds ordered, call to Dr Monique Arteaga for orders.

## 2017-09-25 NOTE — PROGRESS NOTES
Reviewed CT results. Large tumor consistent with cervical primary. One enlarged left external iliac LN suspicious for metastasis. No evidence of hydronephrosis. Will proceed with EUA/Cysto/Procto tomorrow. Will schedule PET/CT as outpatient once biopsy results available.   Kulwant Loaiza MD

## 2017-09-25 NOTE — ED NOTES
Pt awake and alert. Family at bedside. Tolerating transfusion- no s/sx of reaction. Call bell within reach.

## 2017-09-25 NOTE — PROGRESS NOTES
5155 Patient arrived to ICU via ED staff. Jaziel speaking. Pt knows kennedy Rich, communication phone set up at bedside.  has been serving as  in emergency room. Oriented patient to room and call bell system. VSS at this time. Generalized weakness noted. New PRBC unit initiated with CRISPIN Lees. Pt denies chest pain and SOB at this time. Assessment completed. A&OX4. Bilateral clear lung fields on 4L NC. NSR. Abd nontender and soft. Iodoform packing in vagina, pink tinged, brief under patient to monitor for saturation. No blood on brief or underpad, packing remains intact. Jay in place, yellow clear urine. Peripheral pulses present and palpable.  and daughter remain at bedside. 0500 Tolerating infusion. VSS. Denies any current needs. Denies pain/SOB.     0625 New RBC unit initiated. Tolerating well. Vital signs remain stable. No acute events. Resting. Pt states she feels better, less dizziness present. 0700 SCDs placed on patient.

## 2017-09-26 ENCOUNTER — ANESTHESIA (OUTPATIENT)
Dept: SURGERY | Age: 47
DRG: 744 | End: 2017-09-26
Payer: SUBSIDIZED

## 2017-09-26 ENCOUNTER — ANESTHESIA EVENT (OUTPATIENT)
Dept: SURGERY | Age: 47
DRG: 744 | End: 2017-09-26
Payer: SUBSIDIZED

## 2017-09-26 VITALS
DIASTOLIC BLOOD PRESSURE: 60 MMHG | HEIGHT: 60 IN | BODY MASS INDEX: 18.65 KG/M2 | HEART RATE: 90 BPM | SYSTOLIC BLOOD PRESSURE: 107 MMHG | WEIGHT: 95 LBS | OXYGEN SATURATION: 99 % | RESPIRATION RATE: 20 BRPM | TEMPERATURE: 97.6 F

## 2017-09-26 PROBLEM — N88.8 CERVICAL MASS: Status: ACTIVE | Noted: 2017-09-26

## 2017-09-26 LAB
ABO + RH BLD: NORMAL
BLD PROD TYP BPU: NORMAL
BLOOD BANK CMNT PATIENT-IMP: NORMAL
BLOOD GROUP ANTIBODIES SERPL: NORMAL
BPU ID: NORMAL
CALLED TO:,BCALL1: NORMAL
CALLED TO:,BCALL2: NORMAL
CROSSMATCH RESULT,%XM: NORMAL
GLUCOSE BLD STRIP.AUTO-MCNC: 101 MG/DL (ref 70–110)
HCG SERPL QL: NEGATIVE
SPECIMEN EXP DATE BLD: NORMAL
STATUS OF UNIT,%ST: NORMAL
UNIT DIVISION, %UDIV: 0

## 2017-09-26 PROCEDURE — 77030028597 HC ELECTRD LP SQR GYNE -B: Performed by: OBSTETRICS & GYNECOLOGY

## 2017-09-26 PROCEDURE — 74011250636 HC RX REV CODE- 250/636: Performed by: OBSTETRICS & GYNECOLOGY

## 2017-09-26 PROCEDURE — 77030014008 HC SPNG HEMSTAT J&J -C: Performed by: OBSTETRICS & GYNECOLOGY

## 2017-09-26 PROCEDURE — 77030002996 HC SUT SLK J&J -A: Performed by: OBSTETRICS & GYNECOLOGY

## 2017-09-26 PROCEDURE — 74011250637 HC RX REV CODE- 250/637: Performed by: OBSTETRICS & GYNECOLOGY

## 2017-09-26 PROCEDURE — 76210000003 HC OR PH I REC 3.5 TO 4 HR: Performed by: OBSTETRICS & GYNECOLOGY

## 2017-09-26 PROCEDURE — 77030014650 HC SEAL MTRX FLOSEL BAXT -C: Performed by: OBSTETRICS & GYNECOLOGY

## 2017-09-26 PROCEDURE — 74011250636 HC RX REV CODE- 250/636

## 2017-09-26 PROCEDURE — 84703 CHORIONIC GONADOTROPIN ASSAY: CPT | Performed by: OBSTETRICS & GYNECOLOGY

## 2017-09-26 PROCEDURE — 74011000250 HC RX REV CODE- 250: Performed by: OBSTETRICS & GYNECOLOGY

## 2017-09-26 PROCEDURE — 88305 TISSUE EXAM BY PATHOLOGIST: CPT | Performed by: OBSTETRICS & GYNECOLOGY

## 2017-09-26 PROCEDURE — 0TJB8ZZ INSPECTION OF BLADDER, VIA NATURAL OR ARTIFICIAL OPENING ENDOSCOPIC: ICD-10-PCS | Performed by: OBSTETRICS & GYNECOLOGY

## 2017-09-26 PROCEDURE — 36415 COLL VENOUS BLD VENIPUNCTURE: CPT | Performed by: OBSTETRICS & GYNECOLOGY

## 2017-09-26 PROCEDURE — 77030018836 HC SOL IRR NACL ICUM -A: Performed by: OBSTETRICS & GYNECOLOGY

## 2017-09-26 PROCEDURE — 77030018832 HC SOL IRR H20 ICUM -A: Performed by: OBSTETRICS & GYNECOLOGY

## 2017-09-26 PROCEDURE — 76010000138 HC OR TIME 0.5 TO 1 HR: Performed by: OBSTETRICS & GYNECOLOGY

## 2017-09-26 PROCEDURE — 77030018823 HC SLV COMPR VENO -B: Performed by: OBSTETRICS & GYNECOLOGY

## 2017-09-26 PROCEDURE — 0UBC7ZX EXCISION OF CERVIX, VIA NATURAL OR ARTIFICIAL OPENING, DIAGNOSTIC: ICD-10-PCS | Performed by: OBSTETRICS & GYNECOLOGY

## 2017-09-26 PROCEDURE — 74011000272 HC RX REV CODE- 272: Performed by: OBSTETRICS & GYNECOLOGY

## 2017-09-26 PROCEDURE — 82962 GLUCOSE BLOOD TEST: CPT

## 2017-09-26 PROCEDURE — 0DJD8ZZ INSPECTION OF LOWER INTESTINAL TRACT, VIA NATURAL OR ARTIFICIAL OPENING ENDOSCOPIC: ICD-10-PCS | Performed by: OBSTETRICS & GYNECOLOGY

## 2017-09-26 PROCEDURE — 76060000032 HC ANESTHESIA 0.5 TO 1 HR: Performed by: OBSTETRICS & GYNECOLOGY

## 2017-09-26 PROCEDURE — 77030020782 HC GWN BAIR PAWS FLX 3M -B: Performed by: OBSTETRICS & GYNECOLOGY

## 2017-09-26 PROCEDURE — 74011250636 HC RX REV CODE- 250/636: Performed by: ANESTHESIOLOGY

## 2017-09-26 PROCEDURE — 74011000250 HC RX REV CODE- 250

## 2017-09-26 PROCEDURE — 77030011640 HC PAD GRND REM COVD -A: Performed by: OBSTETRICS & GYNECOLOGY

## 2017-09-26 PROCEDURE — 77030028598 HC ELECTRD BALL GYNE -B: Performed by: OBSTETRICS & GYNECOLOGY

## 2017-09-26 PROCEDURE — 77030031139 HC SUT VCRL2 J&J -A: Performed by: OBSTETRICS & GYNECOLOGY

## 2017-09-26 PROCEDURE — 74011000258 HC RX REV CODE- 258: Performed by: OBSTETRICS & GYNECOLOGY

## 2017-09-26 RX ORDER — FERRIC SUBSULFATE 20-22G/100
SOLUTION, NON-ORAL MISCELLANEOUS AS NEEDED
Status: DISCONTINUED | OUTPATIENT
Start: 2017-09-26 | End: 2017-09-26 | Stop reason: HOSPADM

## 2017-09-26 RX ORDER — HYDROCODONE BITARTRATE AND ACETAMINOPHEN 5; 325 MG/1; MG/1
1 TABLET ORAL AS NEEDED
Status: DISCONTINUED | OUTPATIENT
Start: 2017-09-26 | End: 2017-09-26

## 2017-09-26 RX ORDER — ONDANSETRON 2 MG/ML
INJECTION INTRAMUSCULAR; INTRAVENOUS AS NEEDED
Status: DISCONTINUED | OUTPATIENT
Start: 2017-09-26 | End: 2017-09-26 | Stop reason: HOSPADM

## 2017-09-26 RX ORDER — DEXAMETHASONE SODIUM PHOSPHATE 4 MG/ML
INJECTION, SOLUTION INTRA-ARTICULAR; INTRALESIONAL; INTRAMUSCULAR; INTRAVENOUS; SOFT TISSUE AS NEEDED
Status: DISCONTINUED | OUTPATIENT
Start: 2017-09-26 | End: 2017-09-26 | Stop reason: HOSPADM

## 2017-09-26 RX ORDER — HYDROMORPHONE HYDROCHLORIDE 2 MG/1
2-4 TABLET ORAL
Status: DISCONTINUED | OUTPATIENT
Start: 2017-09-26 | End: 2017-09-26 | Stop reason: HOSPADM

## 2017-09-26 RX ORDER — DEXAMETHASONE SODIUM PHOSPHATE 4 MG/ML
INJECTION, SOLUTION INTRA-ARTICULAR; INTRALESIONAL; INTRAMUSCULAR; INTRAVENOUS; SOFT TISSUE AS NEEDED
Status: DISCONTINUED | OUTPATIENT
Start: 2017-09-26 | End: 2017-09-26

## 2017-09-26 RX ORDER — PHENAZOPYRIDINE HYDROCHLORIDE 100 MG/1
100 TABLET, FILM COATED ORAL ONCE
Status: COMPLETED | OUTPATIENT
Start: 2017-09-26 | End: 2017-09-26

## 2017-09-26 RX ORDER — HYDROMORPHONE HYDROCHLORIDE 1 MG/ML
0.5 INJECTION, SOLUTION INTRAMUSCULAR; INTRAVENOUS; SUBCUTANEOUS ONCE
Status: DISCONTINUED | OUTPATIENT
Start: 2017-09-26 | End: 2017-09-26 | Stop reason: HOSPADM

## 2017-09-26 RX ORDER — HYDROMORPHONE HYDROCHLORIDE 1 MG/ML
0.5 INJECTION, SOLUTION INTRAMUSCULAR; INTRAVENOUS; SUBCUTANEOUS
Status: DISCONTINUED | OUTPATIENT
Start: 2017-09-26 | End: 2017-09-26

## 2017-09-26 RX ORDER — DOCUSATE SODIUM 100 MG/1
100 CAPSULE, LIQUID FILLED ORAL 2 TIMES DAILY
Status: DISCONTINUED | OUTPATIENT
Start: 2017-09-26 | End: 2017-09-26 | Stop reason: HOSPADM

## 2017-09-26 RX ORDER — DIPHENHYDRAMINE HYDROCHLORIDE 50 MG/ML
12.5 INJECTION, SOLUTION INTRAMUSCULAR; INTRAVENOUS
Status: DISCONTINUED | OUTPATIENT
Start: 2017-09-26 | End: 2017-09-26 | Stop reason: HOSPADM

## 2017-09-26 RX ORDER — SODIUM CHLORIDE 0.9 % (FLUSH) 0.9 %
5-10 SYRINGE (ML) INJECTION AS NEEDED
Status: DISCONTINUED | OUTPATIENT
Start: 2017-09-26 | End: 2017-09-26 | Stop reason: HOSPADM

## 2017-09-26 RX ORDER — LANOLIN ALCOHOL/MO/W.PET/CERES
1 CREAM (GRAM) TOPICAL 2 TIMES DAILY WITH MEALS
Status: DISCONTINUED | OUTPATIENT
Start: 2017-09-26 | End: 2017-09-26 | Stop reason: HOSPADM

## 2017-09-26 RX ORDER — INSULIN LISPRO 100 [IU]/ML
INJECTION, SOLUTION INTRAVENOUS; SUBCUTANEOUS ONCE
Status: DISCONTINUED | OUTPATIENT
Start: 2017-09-26 | End: 2017-09-26

## 2017-09-26 RX ORDER — LIDOCAINE HYDROCHLORIDE 20 MG/ML
INJECTION, SOLUTION EPIDURAL; INFILTRATION; INTRACAUDAL; PERINEURAL AS NEEDED
Status: DISCONTINUED | OUTPATIENT
Start: 2017-09-26 | End: 2017-09-26 | Stop reason: HOSPADM

## 2017-09-26 RX ORDER — DEXTROSE 50 % IN WATER (D50W) INTRAVENOUS SYRINGE
25-50 AS NEEDED
Status: DISCONTINUED | OUTPATIENT
Start: 2017-09-26 | End: 2017-09-26

## 2017-09-26 RX ORDER — EPHEDRINE SULFATE/0.9% NACL/PF 25 MG/5 ML
SYRINGE (ML) INTRAVENOUS AS NEEDED
Status: DISCONTINUED | OUTPATIENT
Start: 2017-09-26 | End: 2017-09-26 | Stop reason: HOSPADM

## 2017-09-26 RX ORDER — METOCLOPRAMIDE HYDROCHLORIDE 5 MG/ML
INJECTION INTRAMUSCULAR; INTRAVENOUS AS NEEDED
Status: DISCONTINUED | OUTPATIENT
Start: 2017-09-26 | End: 2017-09-26 | Stop reason: HOSPADM

## 2017-09-26 RX ORDER — MAGNESIUM SULFATE 100 %
4 CRYSTALS MISCELLANEOUS AS NEEDED
Status: DISCONTINUED | OUTPATIENT
Start: 2017-09-26 | End: 2017-09-26

## 2017-09-26 RX ORDER — ONDANSETRON 4 MG/1
4 TABLET, ORALLY DISINTEGRATING ORAL
Status: DISCONTINUED | OUTPATIENT
Start: 2017-09-26 | End: 2017-09-26 | Stop reason: HOSPADM

## 2017-09-26 RX ORDER — SODIUM CHLORIDE 0.9 % (FLUSH) 0.9 %
5-10 SYRINGE (ML) INJECTION EVERY 8 HOURS
Status: DISCONTINUED | OUTPATIENT
Start: 2017-09-26 | End: 2017-09-26 | Stop reason: HOSPADM

## 2017-09-26 RX ORDER — SODIUM CHLORIDE 9 MG/ML
125 INJECTION, SOLUTION INTRAVENOUS CONTINUOUS
Status: DISCONTINUED | OUTPATIENT
Start: 2017-09-26 | End: 2017-09-26 | Stop reason: HOSPADM

## 2017-09-26 RX ORDER — HYDROMORPHONE HYDROCHLORIDE 2 MG/1
2-4 TABLET ORAL
Qty: 60 TAB | Refills: 0 | Status: SHIPPED | OUTPATIENT
Start: 2017-09-26 | End: 2018-07-23

## 2017-09-26 RX ORDER — SODIUM CHLORIDE, SODIUM LACTATE, POTASSIUM CHLORIDE, CALCIUM CHLORIDE 600; 310; 30; 20 MG/100ML; MG/100ML; MG/100ML; MG/100ML
150 INJECTION, SOLUTION INTRAVENOUS CONTINUOUS
Status: DISCONTINUED | OUTPATIENT
Start: 2017-09-26 | End: 2017-09-26

## 2017-09-26 RX ORDER — FENTANYL CITRATE 50 UG/ML
INJECTION, SOLUTION INTRAMUSCULAR; INTRAVENOUS AS NEEDED
Status: DISCONTINUED | OUTPATIENT
Start: 2017-09-26 | End: 2017-09-26 | Stop reason: HOSPADM

## 2017-09-26 RX ORDER — MIDAZOLAM HYDROCHLORIDE 1 MG/ML
INJECTION, SOLUTION INTRAMUSCULAR; INTRAVENOUS AS NEEDED
Status: DISCONTINUED | OUTPATIENT
Start: 2017-09-26 | End: 2017-09-26 | Stop reason: HOSPADM

## 2017-09-26 RX ORDER — SODIUM CHLORIDE 0.9 % (FLUSH) 0.9 %
5-10 SYRINGE (ML) INJECTION AS NEEDED
Status: DISCONTINUED | OUTPATIENT
Start: 2017-09-26 | End: 2017-09-26

## 2017-09-26 RX ORDER — SODIUM CHLORIDE, SODIUM LACTATE, POTASSIUM CHLORIDE, CALCIUM CHLORIDE 600; 310; 30; 20 MG/100ML; MG/100ML; MG/100ML; MG/100ML
1000 INJECTION, SOLUTION INTRAVENOUS CONTINUOUS
Status: DISCONTINUED | OUTPATIENT
Start: 2017-09-26 | End: 2017-09-26

## 2017-09-26 RX ORDER — PROPOFOL 10 MG/ML
INJECTION, EMULSION INTRAVENOUS AS NEEDED
Status: DISCONTINUED | OUTPATIENT
Start: 2017-09-26 | End: 2017-09-26 | Stop reason: HOSPADM

## 2017-09-26 RX ORDER — NALOXONE HYDROCHLORIDE 0.4 MG/ML
0.1 INJECTION, SOLUTION INTRAMUSCULAR; INTRAVENOUS; SUBCUTANEOUS AS NEEDED
Status: DISCONTINUED | OUTPATIENT
Start: 2017-09-26 | End: 2017-09-26

## 2017-09-26 RX ORDER — DIPHENHYDRAMINE HYDROCHLORIDE 50 MG/ML
12.5 INJECTION, SOLUTION INTRAMUSCULAR; INTRAVENOUS
Status: DISCONTINUED | OUTPATIENT
Start: 2017-09-26 | End: 2017-09-26

## 2017-09-26 RX ORDER — ALBUTEROL SULFATE 0.83 MG/ML
2.5 SOLUTION RESPIRATORY (INHALATION) AS NEEDED
Status: DISCONTINUED | OUTPATIENT
Start: 2017-09-26 | End: 2017-09-26

## 2017-09-26 RX ORDER — ONDANSETRON 2 MG/ML
4 INJECTION INTRAMUSCULAR; INTRAVENOUS ONCE
Status: DISCONTINUED | OUTPATIENT
Start: 2017-09-26 | End: 2017-09-26

## 2017-09-26 RX ADMIN — ACETAMINOPHEN 650 MG: 10 INJECTION, SOLUTION INTRAVENOUS at 09:42

## 2017-09-26 RX ADMIN — FENTANYL CITRATE 25 MCG: 50 INJECTION, SOLUTION INTRAMUSCULAR; INTRAVENOUS at 09:35

## 2017-09-26 RX ADMIN — PROPOFOL 100 MG: 10 INJECTION, EMULSION INTRAVENOUS at 09:29

## 2017-09-26 RX ADMIN — FENTANYL CITRATE 25 MCG: 50 INJECTION, SOLUTION INTRAMUSCULAR; INTRAVENOUS at 09:32

## 2017-09-26 RX ADMIN — Medication 10 ML: at 14:41

## 2017-09-26 RX ADMIN — DEXAMETHASONE SODIUM PHOSPHATE 4 MG: 4 INJECTION, SOLUTION INTRA-ARTICULAR; INTRALESIONAL; INTRAMUSCULAR; INTRAVENOUS; SOFT TISSUE at 09:39

## 2017-09-26 RX ADMIN — PHENAZOPYRIDINE HYDROCHLORIDE 100 MG: 100 TABLET ORAL at 08:34

## 2017-09-26 RX ADMIN — CEFOXITIN 2 G: 2 INJECTION, POWDER, FOR SOLUTION INTRAVENOUS at 09:34

## 2017-09-26 RX ADMIN — SODIUM CHLORIDE, SODIUM LACTATE, POTASSIUM CHLORIDE, AND CALCIUM CHLORIDE 150 ML/HR: 600; 310; 30; 20 INJECTION, SOLUTION INTRAVENOUS at 11:25

## 2017-09-26 RX ADMIN — Medication 5 MG: at 09:55

## 2017-09-26 RX ADMIN — ONDANSETRON 4 MG: 2 INJECTION INTRAMUSCULAR; INTRAVENOUS at 09:40

## 2017-09-26 RX ADMIN — FENTANYL CITRATE 25 MCG: 50 INJECTION, SOLUTION INTRAMUSCULAR; INTRAVENOUS at 09:50

## 2017-09-26 RX ADMIN — FERROUS SULFATE TAB 325 MG (65 MG ELEMENTAL FE) 325 MG: 325 (65 FE) TAB at 17:28

## 2017-09-26 RX ADMIN — SODIUM CHLORIDE, SODIUM LACTATE, POTASSIUM CHLORIDE, AND CALCIUM CHLORIDE 1000 ML: 600; 310; 30; 20 INJECTION, SOLUTION INTRAVENOUS at 08:39

## 2017-09-26 RX ADMIN — METOCLOPRAMIDE HYDROCHLORIDE 10 MG: 5 INJECTION INTRAMUSCULAR; INTRAVENOUS at 09:22

## 2017-09-26 RX ADMIN — MIDAZOLAM HYDROCHLORIDE 2 MG: 1 INJECTION, SOLUTION INTRAMUSCULAR; INTRAVENOUS at 09:22

## 2017-09-26 RX ADMIN — FENTANYL CITRATE 25 MCG: 50 INJECTION, SOLUTION INTRAMUSCULAR; INTRAVENOUS at 09:47

## 2017-09-26 RX ADMIN — LIDOCAINE HYDROCHLORIDE 40 MG: 20 INJECTION, SOLUTION EPIDURAL; INFILTRATION; INTRACAUDAL; PERINEURAL at 09:28

## 2017-09-26 RX ADMIN — SODIUM CHLORIDE 125 ML/HR: 900 INJECTION, SOLUTION INTRAVENOUS at 14:40

## 2017-09-26 NOTE — ANESTHESIA POSTPROCEDURE EVALUATION
Post-Anesthesia Evaluation & Assessment    Visit Vitals    /50    Pulse 68    Temp 36.4 °C (97.5 °F)    Resp 18    Ht 5' (1.524 m)    Wt 43.1 kg (95 lb)    SpO2 100%    BMI 18.55 kg/m2       Nausea/Vomiting: no nausea    Post-operative hydration adequate. Pain score (VAS): 2    Mental status & Level of consciousness: alert and oriented x 3    Neurological status: moves all extremities, sensation grossly intact    Pulmonary status: airway patent, no supplemental oxygen required    Complications related to anesthesia: none    Patient has met all discharge requirements.     Additional comments:        Gonzalez Vargas MD

## 2017-09-26 NOTE — PERIOP NOTES
TRANSFER - IN REPORT:    Verbal report received from Jonathan Berumen RN(name) on Sukhjinder Watt  being received from [employer]) for routine post - op      Report consisted of patients Situation, Background, Assessment and   Recommendations(SBAR). Information from the following report(s) SBAR, OR Summary, Procedure Summary, Intake/Output, MAR and Recent Results was reviewed with the receiving nurse. Opportunity for questions and clarification was provided. Assessment completed upon patients arrival to unit and care assumed.

## 2017-09-26 NOTE — PERIOP NOTES
at bedside to see patient. Patient denies any discomfort or pain.  and patient deny any questions at this time.

## 2017-09-26 NOTE — PROGRESS NOTES
Dr Litzy Dumont visited & was toldd pt is going to OR in 1hr. . Permit signed by . Pt speaks only Turkmen &  @ bedside. See pre -op checklist.Report given to to [facility] RN @ [phone] Make aware that pyidium & antibiotic not given. 0800- To OR via ICU bed by OR staff. &  w/ her.    56- Pacu called for report & told saw the order to transfer to surgical floor.

## 2017-09-26 NOTE — PERIOP NOTES
Dr. Annie Shah wants patient to go to med/surg floor. Room assignment received, 328. Floor nurse receiving patient reading through SBAR, awaiting verbal report for transfer.

## 2017-09-26 NOTE — PROGRESS NOTES
Assumed care; Pt resting in bed; no distress noted; Pt denies pain; Pt ambulated to bathroom and assisted back to bed; bed in low position; Side rails up x 2; Call light and personal belonging within reach. Will continue to monitor. 1720: Discharge instruction by Dr Brandi Hardy  [id number]: Discharge instruction, medication and follow up instruction reviewed with pt and spouse at bedside; No change in complex assessment; Pt denies pain; No distress noted; Vitals stable; Pt ambulatory with no noted dizziness. 1820: Pt wheeled out with spouse, discharge instructions.

## 2017-09-26 NOTE — PERIOP NOTES
Patient spouse at bedside. Informed of room assignment to 328. Will notify him upon transfer of pt. From PACU to Floor.

## 2017-09-26 NOTE — ROUTINE PROCESS
TRANSFER - IN REPORT:    Verbal report received from Piyush Culp on Aspen Pateer  being received from ICU for ordered procedure      Report consisted of patients Situation, Background, Assessment and   Recommendations(SBAR). Information from the following report(s) SBAR was reviewed with the receiving nurse. Opportunity for questions and clarification was provided. Assessment completed upon patients arrival to unit and care assumed.

## 2017-09-26 NOTE — PROGRESS NOTES
1920 hrs. Report received. Initial assessment completed. Alert and nods appropriately.  at bedside and is able to ask Questions in [place]. Pain score 2/10. No Vaginal fluid loss on pad. Afebrile. VS'S Monitoring in     2000 hrs. H/H now 13.6/39. 1. Resting quietly. .    2130 hrs Pre op bath and linen change attended. 2330 hrs. Reassessment completed. VSS No per vaginal bleeding. Afebrile. 0400 hrs ,uneventful night. NPO since 12 MN.    0720 hrs Bedside and Verbal shift change report given to CHIDI Gibbs by CHIDI gustafson (offgoing nurse). Report included the following information SBAR, Kardex, Intake/Output, MAR and Recent Results.

## 2017-09-26 NOTE — DISCHARGE SUMMARY
GYN Discharge Summary                        Patient ID:  Steffi Hawk  49 y.o. Admission Date: 9/24/2017  Discharge Date:  09/26/17                                                 Attending: Dandy Torres MD   PCP: Ayse Dial MD                                                                                               Reason for admission:vaginal bleeding    Discharge  diagnosis: Principal Problem:    Cervical mass (9/26/2017)    Active Problems:    Vaginal bleeding (9/25/2017)        Associated Conditions:        Patient Active Problem List   Diagnosis Code    Vaginal bleeding N93.9    Cervical mass N88.8            History reviewed. No pertinent past medical history. Operative Procedure:   Pelvic examination under anesthesia, cervical biopsy, cystoscopy, proctoscopy    Complications:  none                   Transfusion:  5 units pRBCs    Hospital Course: Admitted via ED with heavy vaginal bleeding and acute on chronic blood loss anemia with Hgb 4.8. Seen by Dr. Prasad Castellon in ED. Pelvic exam revealed a large friable cervical mass. Vaginal packing was placed. Patient received 5 untis pRBCs and Hgb increased to 13.6. Patient was taken to OR for EUA, cysto, procto, cervical biopsy. Findings are consistent with Stage IIIB cervical cancer. Discussed findings with patient and . PET/CT will be ordered as outpatient and patient is instructed to follow up after this test with Dr. Padma Meeks in my office.      Condition at discharge: stable, Afebrile, Ambulating and Eating, Drinking, Voiding    Labs:  Lab Results   Component Value Date/Time    WBC 20.2 09/25/2017 07:10 PM    HGB 13.6 09/25/2017 07:10 PM    HCT 39.1 09/25/2017 07:10 PM    PLATELET 682 11/37/8067 07:10 PM    MCV 77.4 09/25/2017 07:10 PM     Lab Results   Component Value Date/Time    Sodium 138 09/24/2017 11:00 PM    Potassium 3.7 09/24/2017 11:00 PM    Chloride 102 09/24/2017 11:00 PM    CO2 28 09/24/2017 11:00 PM    Anion gap 8 09/24/2017 11:00 PM    Glucose 158 09/24/2017 11:00 PM    BUN 7 09/24/2017 11:00 PM    Creatinine 0.64 09/24/2017 11:00 PM    BUN/Creatinine ratio 11 09/24/2017 11:00 PM    GFR est AA >60 09/24/2017 11:00 PM    GFR est non-AA >60 09/24/2017 11:00 PM         Current Discharge Medication List      START taking these medications    Details   HYDROmorphone (DILAUDID) 2 mg tablet Take 1-2 Tabs by mouth every four (4) hours as needed. Max Daily Amount: 24 mg. Indications: neoplasm related pain  Qty: 60 Tab, Refills: 0         CONTINUE these medications which have NOT CHANGED    Details   docusate sodium (DOK) 100 mg tab Take  by mouth. ferrous sulfate 325 mg (65 mg iron) tablet Take  by mouth Daily (before breakfast).          STOP taking these medications       progesterone (PROMETRIUM) 200 mg capsule Comments:   Reason for Stopping:                 Patient Instructions:        Disposition:  Home    Follow-up:  Follow up with Dr. Brandi Hardy in 1 weeks    Author:   Vlad Kerns MD  Gynecologic Oncology  9/26/2017  5:30 PM

## 2017-09-26 NOTE — ANESTHESIA PREPROCEDURE EVALUATION
Anesthetic History   No history of anesthetic complications            Review of Systems / Medical History  Patient summary reviewed, nursing notes reviewed and pertinent labs reviewed    Pulmonary  Within defined limits                 Neuro/Psych   Within defined limits           Cardiovascular                  Exercise tolerance: >4 METS     GI/Hepatic/Renal  Within defined limits              Endo/Other  Within defined limits           Other Findings              Physical Exam    Airway  Mallampati: II  TM Distance: 4 - 6 cm  Neck ROM: normal range of motion   Mouth opening: Normal     Cardiovascular  Regular rate and rhythm,  S1 and S2 normal,  no murmur, click, rub, or gallop             Dental    Dentition: Caps/crowns     Pulmonary  Breath sounds clear to auscultation               Abdominal  GI exam deferred       Other Findings            Anesthetic Plan    ASA: 2  Anesthesia type: general          Induction: Intravenous  Anesthetic plan and risks discussed with: Patient      Emanuel Mello via phone [phone]

## 2017-09-26 NOTE — BRIEF OP NOTE
BRIEF OPERATIVE NOTE    Date of Procedure: 9/26/2017   Preoperative Diagnosis: CERVICAL MASS, VAGINAL BLEEDING, ACUTE & CHRONIC BLOOD LOSS ANEMIA, URINARY URGENCY,CONSTIPATION  Postoperative Diagnosis: CERVICAL MASS, VAGINAL BLEEDING, ACUTE & CHRONIC BLOOD LOSS ANEMIA, URINARY URGENCY,CONSTIPATION  Procedure(s):  EXAM UNDER ANESTHESIA, CYSTOSCOPY, PROCTOSCOPY, CERVICAL BIOPSY   Surgeon(s) and Role:     * Hsoea De Paz MD - Primary         Assistant Staff: Philomena [address]       Surgical Staff:  Circ-1: Gt Cano RN  Circ-2: Sergio Zelaya RN  Circ-Relief: Stevphen Gaucher, RN  Scrub Tech-1: Homero Sanborn  Surg Asst-1: Theone Session  Event Time In   Incision Start 2409   Incision Close 1007     Anesthesia: General   Estimated Blood Loss:  50cc  Specimens:   ID Type Source Tests Collected by Time Destination   1 : cervical mass Preservative Cervix  Hosea De Paz MD 9/26/2017 8064 Pathology      Findings: Cystoscopy showed cystitis and external compression on the posterior bladder wall. Active jets of urine were seen from the right and left ureteral orifices. No evidence of tumor invasion. Proctoscopy showed normal rectal mucosa without lesions or tumor extension. External compression evident on the anterior rectal wall. Vaginal and rectovaginal examination reveals a 8+ cm friable ectocervical tumor extending to the right and left parametria, and anterior vaginal wall.   Complications: none  Implants: * No implants in log *  [id number]

## 2017-09-26 NOTE — PERIOP NOTES
Dr. Mu Kurtz notified of patient status, v/s. He stated he will review v/s for out note consideration.

## 2017-09-26 NOTE — PROGRESS NOTES
No new complaints.    AF, VSS  Hgb now 13.6 from 4.8   Reviewed CT results with patient and   Will proceed with EUA/Cysto/Procto now   Jae Kirby MD

## 2017-09-26 NOTE — DIABETES MGMT
GLYCEMIC CONTROL SCREENING INITIATED:    -pt with no known h/o DM, No DM meds listed in PTA      Lab Results   Component Value Date/Time    Hemoglobin A1c 6.8 09/25/2017 07:15 PM      Lab Results   Component Value Date/Time    Glucose 158 09/24/2017 11:00 PM         [x]  Glucose values exceeding inpatient target range: -180mg/dl            non-ICU 70-140mg/dl      []  Patient meets criteria for pre-diabetes   HbA1c = 5.7-6.4%      [x]  Patient meets criteria for diabetes HbA1c ? 6.5%      []  Recommend discontinuing oral anti-diabetic medications until discharge. []  Recommend basal insulin per IP Insulin order set. []  Recommend meal time insulin per IP Insulin order set. [x]  Recommend corrective insulin per IP Insulin order set. [x]  Standard insulin scale  i[]  Very insuln resistant scale       []  Patient meets criteria for IV Insulin Infusion/GlucoStabilizer order set. []  Patient has had a hypoglycemic event, recommend      [x]  Recommend blood glucose monitoring       []  Patient will need prescription for glucometer, test strips and lancets. []  Recommend carbohydrate controlled diabetic diet. []  Diabetes Self-Management class schedule provided and patient encouraged to attend.     [] Other      Rohit Roy RN, MS  Glycemic Control Team

## 2017-09-26 NOTE — PERIOP NOTES
TRANSFER - OUT REPORT:    Verbal report given to KATY Whitman (name) on Vlad Meza  being transferred to (unit) for routine post - op       Report consisted of patients Situation, Background, Assessment and   Recommendations(SBAR). Information from the following report(s) OR Summary, Procedure Summary and Intake/Output was reviewed with the receiving nurse. Lines:   Peripheral IV 09/25/17 Right Antecubital (Active)   Site Assessment Clean, dry, & intact 9/26/2017  1:30 PM   Phlebitis Assessment 0 9/26/2017  1:30 PM   Infiltration Assessment 0 9/26/2017  1:30 PM   Dressing Status Clean, dry, & intact 9/26/2017  1:30 PM   Dressing Type Tape;Transparent 9/26/2017  1:30 PM   Hub Color/Line Status Pink; Infusing 9/26/2017  1:30 PM   Action Taken Open ports on tubing capped 9/26/2017  8:00 AM   Alcohol Cap Used Yes 9/26/2017  8:00 AM       Peripheral IV 09/26/17 Right Forearm (Active)   Site Assessment Clean, dry, & intact 9/26/2017 12:43 PM   Phlebitis Assessment 0 9/26/2017 12:43 PM   Infiltration Assessment 0 9/26/2017 12:43 PM   Dressing Status Clean, dry, & intact 9/26/2017 12:43 PM   Dressing Type Transparent 9/26/2017 12:43 PM   Hub Color/Line Status Blue;Capped 9/26/2017 12:43 PM   Action Taken Open ports on tubing capped 9/26/2017  8:00 AM   Alcohol Cap Used Yes 9/26/2017  8:00 AM        Opportunity for questions and clarification was provided.       Patient transported with: On Room [employer]

## 2017-09-26 NOTE — DISCHARGE INSTRUCTIONS
DISCHARGE SUMMARY from Nurse    The following personal items are in your possession at time of discharge:    Dental Appliances: None  Visual Aid: None     Home Medications: None  Jewelry: None  Clothing: None  Other Valuables: None             PATIENT INSTRUCTIONS:    After general anesthesia or intravenous sedation, for 24 hours or while taking prescription Narcotics:  · Limit your activities  · Do not drive and operate hazardous machinery  · Do not make important personal or business decisions  · Do  not drink alcoholic beverages  · If you have not urinated within 8 hours after discharge, please contact your surgeon on call. Report the following to your surgeon:  · Excessive pain, swelling, redness or odor of or around the surgical area  · Temperature over 100.5  · Nausea and vomiting lasting longer than 4 hours or if unable to take medications  · Any signs of decreased circulation or nerve impairment to extremity: change in color, persistent  numbness, tingling, coldness or increase pain  · Any questions        What to do at Home:  Recommended activity: Activity as tolerated, or as recommended by your PCP    If you experience any of the following symptoms listed under ANEMIA, VAGINAL BLEEDING please follow up with your PCP. *  Please give a list of your current medications to your Primary Care Provider. *  Please update this list whenever your medications are discontinued, doses are      changed, or new medications (including over-the-counter products) are added. *  Please carry medication information at all times in case of emergency situations. These are general instructions for a healthy lifestyle:    No smoking/ No tobacco products/ Avoid exposure to second hand smoke    Surgeon General's Warning:  Quitting smoking now greatly reduces serious risk to your health.     Obesity, smoking, and sedentary lifestyle greatly increases your risk for illness    A healthy diet, regular physical exercise & weight monitoring are important for maintaining a healthy lifestyle    You may be retaining fluid if you have a history of heart failure or if you experience any of the following symptoms:  Weight gain of 3 pounds or more overnight or 5 pounds in a week, increased swelling in our hands or feet or shortness of breath while lying flat in bed. Please call your doctor as soon as you notice any of these symptoms; do not wait until your next office visit. Recognize signs and symptoms of STROKE:    F-face looks uneven    A-arms unable to move or move unevenly    S-speech slurred or non-existent    T-time-call 911 as soon as signs and symptoms begin-DO NOT go       Back to bed or wait to see if you get better-TIME IS BRAIN. Warning Signs of HEART ATTACK     Call 911 if you have these symptoms:   Chest discomfort. Most heart attacks involve discomfort in the center of the chest that lasts more than a few minutes, or that goes away and comes back. It can feel like uncomfortable pressure, squeezing, fullness, or pain.  Discomfort in other areas of the upper body. Symptoms can include pain or discomfort in one or both arms, the back, neck, jaw, or stomach.  Shortness of breath with or without chest discomfort.  Other signs may include breaking out in a cold sweat, nausea, or lightheadedness. Don't wait more than five minutes to call 911 - MINUTES MATTER! Fast action can save your life. Calling 911 is almost always the fastest way to get lifesaving treatment. Emergency Medical Services staff can begin treatment when they arrive -- up to an hour sooner than if someone gets to the hospital by car. The discharge information has been reviewed with the patient and spouse. The patient and spouse verbalized understanding. Discharge medications reviewed with the patient and spouse and appropriate educational materials and side effects teaching were provided.            Vaginal Bleeding in Nonpregnant Women: Care Instructions  Your Care Instructions    Many women have bleeding or spotting between periods. Lots of things can cause it. You may bleed because of hormone problems, stress, or ovulation. Fibroids and IUDs (intrauterine devices) can also cause bleeding. If your bleeding or spotting is caused by one of these things and is not heavy or doesn't happen often, you probably don't need to worry. But in rare cases, infection, cancer, or other serious conditions can cause bleeding. So you may need more tests to find the cause of your bleeding. The doctor has checked you carefully, but problems can develop later. If you notice any problems or new symptoms, get medical treatment right away. Follow-up care is a key part of your treatment and safety. Be sure to make and go to all appointments, and call your doctor if you are having problems. It's also a good idea to know your test results and keep a list of the medicines you take. How can you care for yourself at home? · Take pain medicines exactly as directed. ¨ If the doctor gave you a prescription medicine for pain, take it as prescribed. ¨ If you are not taking a prescription pain medicine, ask your doctor if you can take an over-the-counter medicine. Do not take aspirin, which may make bleeding worse. · If your doctor prescribed birth control pills for your bleeding, take them as directed. · Eat foods that are high in iron and vitamin C. Foods high in iron include red meat, shellfish, eggs, beans, and leafy green vegetables. Foods high in vitamin C include citrus fruits, tomatoes, and broccoli. Ask your doctor if you need to take iron pills or a multivitamin. · Ask your doctor when it is okay to have sex. When should you call for help? Call 911 anytime you think you may need emergency care. For example, call if:  · You passed out (lost consciousness). · You have sudden, severe pain in your belly or pelvis.   Call your doctor now or seek immediate medical care if:  · You have severe vaginal bleeding. You are soaking through your usual pads or tampons each hour for 2 or more hours. · You are dizzy or lightheaded or you feel like you may faint. · You have new belly or pelvic pain. · You have a fever. · Your bleeding gets worse. Watch closely for changes in your health, and be sure to contact your doctor if:  · You have new or worse vaginal discharge. · You do not get better as expected. Where can you learn more? Go to http://hilary-thelma.info/. Enter F256 in the search box to learn more about \"Vaginal Bleeding in Nonpregnant Women: Care Instructions. \"  Current as of: October 13, 2016  Content Version: 11.3  © 9974-1258 [employer]. Care instructions adapted under license by [employer] (which disclaims liability or warranty for this information). If you have questions about a medical condition or this instruction, always ask your healthcare professional. [place] [address] any warranty or liability for your use of this information. Anemia: Care Instructions  Your Care Instructions    Anemia is a low level of red blood cells, which carry oxygen throughout your body. Many things can cause anemia. Lack of iron is one of the most common causes. Your body needs iron to make hemoglobin, a substance in red blood cells that carries oxygen from the lungs to your body's cells. Without enough iron, the body produces fewer and smaller red blood cells. As a result, your body's cells do not get enough oxygen, and you feel tired and weak. And you may have trouble concentrating. Bleeding is the most common cause of a lack of iron. You may have heavy menstrual bleeding or bleeding caused by conditions such as ulcers, hemorrhoids, or cancer. Regular use of aspirin or other anti-inflammatory medicines (such as ibuprofen) also can cause bleeding in some people.  A lack of iron in your diet also can cause anemia, especially at times when the body needs more iron, such as during pregnancy, infancy, and the teen years. Your doctor may have prescribed iron pills. It may take several months of treatment for your iron levels to return to normal. Your doctor also may suggest that you eat foods that are rich in iron, such as meat and beans. There are many other causes of anemia. It is not always due to a lack of iron. Finding the specific cause of your anemia will help your doctor find the right treatment for you. Follow-up care is a key part of your treatment and safety. Be sure to make and go to all appointments, and call your doctor if you are having problems. It's also a good idea to know your test results and keep a list of the medicines you take. How can you care for yourself at home? · Take your medicines exactly as prescribed. Call your doctor if you think you are having a problem with your medicine. · If your doctor recommends iron pills, take them as directed:  ¨ Try to take the pills on an empty stomach about 1 hour before or 2 hours after meals. But you may need to take iron with food to avoid an upset stomach. ¨ Do not take antacids or drink milk or caffeine drinks (such as coffee, tea, or cola) at the same time or within 2 hours of the time that you take your iron. They can make it hard for your body to absorb the iron. ¨ Vitamin C (from food or supplements) helps your body absorb iron. Try taking iron pills with a glass of orange juice or some other food that is high in vitamin C, such as citrus fruits. ¨ Iron pills may cause stomach problems, such as heartburn, nausea, diarrhea, constipation, and cramps. Be sure to drink plenty of fluids, and include fruits, vegetables, and fiber in your diet each day. Iron pills often make your bowel movements dark or green. ¨ If you forget to take an iron pill, do not take a double dose of iron the next time you take a pill.   ¨ Keep iron pills out of the reach of small children. An overdose of iron can be very dangerous. · Follow your doctor's advice about eating iron-rich foods. These include red meat, shellfish, poultry, eggs, beans, raisins, whole-grain bread, and leafy green vegetables. · Steam vegetables to help them keep their iron content. When should you call for help? Call 911 anytime you think you may need emergency care. For example, call if:  · You have symptoms of a heart attack. These may include:  ¨ Chest pain or pressure, or a strange feeling in the chest.  ¨ Sweating. ¨ Shortness of breath. ¨ Nausea or vomiting. ¨ Pain, pressure, or a strange feeling in the back, neck, jaw, or upper belly or in one or both shoulders or arms. ¨ Lightheadedness or sudden weakness. ¨ A fast or irregular heartbeat. After you call 911, the  may tell you to chew 1 adult-strength or 2 to 4 low-dose aspirin. Wait for an ambulance. Do not try to drive yourself. · You passed out (lost consciousness). Call your doctor now or seek immediate medical care if:  · You have new or increased shortness of breath. · You are dizzy or lightheaded, or you feel like you may faint. · Your fatigue and weakness continue or get worse. · You have any abnormal bleeding, such as:  ¨ Nosebleeds. ¨ Vaginal bleeding that is different (heavier, more frequent, at a different time of the month) than what you are used to. ¨ Bloody or black stools, or rectal bleeding. ¨ Bloody or pink urine. Watch closely for changes in your health, and be sure to contact your doctor if:  · You do not get better as expected. Where can you learn more? Go to http://hilary-thelma.info/. Enter R301 in the search box to learn more about \"Anemia: Care Instructions. \"  Current as of: October 13, 2016  Content Version: 11.3  © 3249-7066 [employer].  Care instructions adapted under license by [employer] (which disclaims liability or warranty for this information). If you have questions about a medical condition or this instruction, always ask your healthcare professional. [place] [address] any warranty or liability for your use of this information. Patient armband removed and shredded.

## 2017-09-26 NOTE — OP NOTES
[address] [place] [address]  OPERATIVE REPORT    Name:  Rusty Roach  MR#:  [id number]  :  1970  Account #:  [de-identified]  Date of Adm:  2017  Date of Surgery:  2017      PREOPERATIVE DIAGNOSES  1. Cervical mass. 2. Vaginal bleeding. 3. Acute on chronic blood loss anemia. 4. Urinary urgency. 5. Constipation. POSTOPERATIVE DIAGNOSES  1. Cervical mass consistent with a stage IIIB cervical cancer. 2. Vaginal bleeding. 3. Acute on chronic blood loss anemia. 4. Urinary urgency. 5. Constipation. PROCEDURES PERFORMED  1. Pelvic examination under anesthesia. 2. Cystoscopy. 3. Proctoscopy. 4. Cervical biopsies. SURGEON: Ayesha Gomez MD    ASSISTANT: Alejandro Richter SA    ANESTHESIA: General.    ANESTHESIOLOGIST: Dr. Jordon De Leon. CRNA: Rosalino Hancock CRNA.    ESTIMATED BLOOD LOSS: 50 mL. SPECIMENS REMOVED: Cervical biopsy. FINDINGS: Cystoscopy showed cystitis and external compression on  the posterior bladder wall. Active jets of urine were seen from the right  and left ureteral orifices. No evidence of tumor invasion into the  mucosa of the bladder. Proctoscopy showed normal rectal mucosa  without lesions or tumor extension. External compression evident on  the anterior rectal wall. Vaginal and rectovaginal examination revealed  an 8 cm friable ectocervical tumor extending to the right parametria  and left pelvic side wall with anterior vaginal wall extension. COMPLICATIONS: None. INDICATIONS: The patient is a 50-year-old  1, para 1   female admitted to [facility] via the emergency  department by Dr. Chris Mathis with heavy vaginal bleeding, severe  anemia and hypotension. Hemoglobin on presentation was 4.8. The  patient received multiple units of packed red blood cell transfusion  bringing the hemoglobin up to 13. 6. Pelvic examination in the  emergency department revealed a hemorrhagic friable large cervical  tumor. Biopsies were not performed in the emergency department due  to severe anemia and concern for further bleeding. Vaginal packing  was placed. CT scan was performed which showed 8.2 x 7.6 x 8.4 cm  heterogenous enhancing pelvic mass in the region of the uterine  cervix. Loss of fat planes were seen posterior with the rectal vault and  anteriorly with the bladder. The left proximal external iliac lymph node  was enlarged, measuring 1.5 x 1.9 cm on CT scan. The patient was  counseled regarding clinical findings and examination under  anesthesia was recommended for definitive diagnosis and staging of  was presumed to be a cervical cancer. Risks, benefits and alternatives  of the procedure were reviewed with the patient and her . The  patient desired to proceed and written consent was obtained. DESCRIPTION OF PROCEDURE: After the patient was accurately  identified, consent was verified, signed on the chart, and all questions  were answered, the patient was taken to the operating room with IV  running. She was placed in the supine position. Sequential  compression devices were placed on the bilateral lower extremities  and were functioning prior to induction of anesthesia. Mefoxin 2 grams  was given intravenously as prophylactic antibiotics within 1 hour prior  to the procedure. The patient underwent dosing of general anesthesia  with LMA. She was placed in the low dorsal lithotomy position in [facility] and prepped and draped in normal fashion using Betadine  externally. A 70 degree cystoscope with a 17-Maori sheath was easily  passed through the urethra. The urethral mucosa was visualized. The  bladder mucosa was visualized. Active jets ureter were seen from the  right and left ureteral orifices. There was cystitis noted in the posterior  wall of the bladder. There was no evidence of mucosal involvement of  tumor. There was external mass effect visible in the posterior wall of  the bladder.  The bladder was drained and the cystoscope was  removed and attention was paid to the rectum where the rigid  proctoscope was lubricated and carefully inserted into the anus to a  depth of 8 cm. Air instilled into the rectum and the rectal mucosa was  visualized and was normal in appearance. There with mass effect  externally on the anterior wall of the rectum but there was no rectal  mucosal involvement with tumor. Air was allowed to escape from the  anus and the proctoscope was removed. A speculum was placed in  the vagina. The cervix was visualized. A friable exophytic tumor  replacing the cervix and extending down the anterior wall of the vagina  was visualized. Biopsies were taken and sent to pathology for  permanent pathologic evaluation. On rectovaginal examination, the  tumor extended to the right and left parametria and further extended to  the left pelvic side wall as well as the anterior cul-de-sac and 3 cm  down the anterior mucosa of the vagina. The tumor was estimated to  be approximately 8 cm in total size. Monsel's was applied copiously to  the hemorrhagic tumor. Gelfoam was also applied as well as FloSeal,  SNoW and Nu-knit for hemostasis. Vaginal packing was not required. Once hemostasis was observed, the patient was replaced in the  supine position, awakened from anesthesia, extubated, transferred to  the hospital bed and transported to the PACU awake in stable  condition. All sharp instrument and sponge counts were correct. There  were no complications with this procedure.         Gladys Rodriguez MD    DD / DC  D:  09/26/2017   17:06  T:  09/26/2017   18:03  Job #:  [id number]

## 2017-09-27 ENCOUNTER — HOSPITAL ENCOUNTER (INPATIENT)
Age: 47
LOS: 1 days | Discharge: ANOTHER HOSPITAL | DRG: 755 | End: 2017-09-28
Attending: FAMILY MEDICINE | Admitting: OBSTETRICS & GYNECOLOGY
Payer: SUBSIDIZED

## 2017-09-27 DIAGNOSIS — N93.9 VAGINAL BLEEDING: ICD-10-CM

## 2017-09-27 DIAGNOSIS — R19.00 PELVIC MASS: ICD-10-CM

## 2017-09-27 DIAGNOSIS — C53.1 MALIGNANT NEOPLASM OF EXOCERVIX (HCC): Primary | ICD-10-CM

## 2017-09-27 DIAGNOSIS — D62 ACUTE BLOOD LOSS ANEMIA: ICD-10-CM

## 2017-09-27 DIAGNOSIS — N88.8 CERVICAL MASS: Primary | ICD-10-CM

## 2017-09-27 LAB
ANION GAP SERPL CALC-SCNC: 8 MMOL/L (ref 3–18)
APTT PPP: 25.2 SEC (ref 23–36.4)
BACTERIA SPEC CULT: NORMAL
BASOPHILS # BLD: 0 K/UL (ref 0–0.1)
BASOPHILS NFR BLD: 0 % (ref 0–3)
BUN SERPL-MCNC: 4 MG/DL (ref 7–18)
BUN/CREAT SERPL: 7 (ref 12–20)
CALCIUM SERPL-MCNC: 8.5 MG/DL (ref 8.5–10.1)
CHLORIDE SERPL-SCNC: 100 MMOL/L (ref 100–108)
CO2 SERPL-SCNC: 28 MMOL/L (ref 21–32)
CREAT SERPL-MCNC: 0.55 MG/DL (ref 0.6–1.3)
DIFFERENTIAL METHOD BLD: ABNORMAL
EOSINOPHIL # BLD: 2.5 K/UL (ref 0–0.4)
EOSINOPHIL NFR BLD: 12 % (ref 0–5)
ERYTHROCYTE [DISTWIDTH] IN BLOOD BY AUTOMATED COUNT: 20.5 % (ref 11.6–14.5)
GLUCOSE SERPL-MCNC: 99 MG/DL (ref 74–99)
HCT VFR BLD AUTO: 28.2 % (ref 35–45)
HCT VFR BLD AUTO: 38 % (ref 35–45)
HGB BLD-MCNC: 12.5 G/DL (ref 12–16)
HGB BLD-MCNC: 9.7 G/DL (ref 12–16)
INR PPP: 1 (ref 0.8–1.2)
LYMPHOCYTES # BLD: 1.1 K/UL (ref 0.8–3.5)
LYMPHOCYTES NFR BLD: 5 % (ref 20–51)
MCH RBC QN AUTO: 26.2 PG (ref 24–34)
MCHC RBC AUTO-ENTMCNC: 32.9 G/DL (ref 31–37)
MCV RBC AUTO: 79.5 FL (ref 74–97)
MONOCYTES # BLD: 2.1 K/UL (ref 0–1)
MONOCYTES NFR BLD: 10 % (ref 2–9)
NEUTS SEG # BLD: 15.4 K/UL (ref 1.8–8)
NEUTS SEG NFR BLD: 73 % (ref 42–75)
PLATELET # BLD AUTO: 312 K/UL (ref 135–420)
PMV BLD AUTO: 9.2 FL (ref 9.2–11.8)
POTASSIUM SERPL-SCNC: 3.9 MMOL/L (ref 3.5–5.5)
PROTHROMBIN TIME: 12.2 SEC (ref 11.5–15.2)
RBC # BLD AUTO: 4.78 M/UL (ref 4.2–5.3)
RBC MORPH BLD: ABNORMAL
SERVICE CMNT-IMP: NORMAL
SODIUM SERPL-SCNC: 136 MMOL/L (ref 136–145)
WBC # BLD AUTO: 21.1 K/UL (ref 4.6–13.2)

## 2017-09-27 PROCEDURE — 77030005563 HC CATH URETH INT MMGH -A

## 2017-09-27 PROCEDURE — 65610000006 HC RM INTENSIVE CARE

## 2017-09-27 PROCEDURE — 85610 PROTHROMBIN TIME: CPT | Performed by: FAMILY MEDICINE

## 2017-09-27 PROCEDURE — 80048 BASIC METABOLIC PNL TOTAL CA: CPT | Performed by: FAMILY MEDICINE

## 2017-09-27 PROCEDURE — 85730 THROMBOPLASTIN TIME PARTIAL: CPT | Performed by: FAMILY MEDICINE

## 2017-09-27 PROCEDURE — 74011250636 HC RX REV CODE- 250/636: Performed by: FAMILY MEDICINE

## 2017-09-27 PROCEDURE — 99285 EMERGENCY DEPT VISIT HI MDM: CPT

## 2017-09-27 PROCEDURE — 85018 HEMOGLOBIN: CPT | Performed by: FAMILY MEDICINE

## 2017-09-27 PROCEDURE — 51701 INSERT BLADDER CATHETER: CPT

## 2017-09-27 PROCEDURE — 86900 BLOOD TYPING SEROLOGIC ABO: CPT | Performed by: FAMILY MEDICINE

## 2017-09-27 PROCEDURE — 86920 COMPATIBILITY TEST SPIN: CPT | Performed by: FAMILY MEDICINE

## 2017-09-27 PROCEDURE — 85025 COMPLETE CBC W/AUTO DIFF WBC: CPT | Performed by: FAMILY MEDICINE

## 2017-09-27 RX ORDER — SODIUM CHLORIDE 9 MG/ML
250 INJECTION, SOLUTION INTRAVENOUS AS NEEDED
Status: DISCONTINUED | OUTPATIENT
Start: 2017-09-27 | End: 2017-09-28 | Stop reason: HOSPADM

## 2017-09-27 RX ADMIN — SODIUM CHLORIDE 1000 ML: 900 INJECTION, SOLUTION INTRAVENOUS at 20:27

## 2017-09-27 NOTE — ED TRIAGE NOTES
Pt admitted for vaginal bleeding and released yesterday. Hgb was down to 4.8 during previous stay. Symptoms have returned.   C/O abdominal pain, no vomiting

## 2017-09-27 NOTE — ED PROVIDER NOTES
[address]  EMERGENCY DEPARTMENT HISTORY AND PHYSICAL EXAM       Date: 9/27/2017   Patient Name: Pretty St   YOB: 1970  Medical Record Number: [id number]    History of Presenting Illness     Chief Complaint   Patient presents with    Vaginal Bleeding     Released from facility yesterday for similar concerns. sees Dr. Mary Arce for GYN ONC needs        History Provided By:  patient    Additional History: 6:30 PM   Pretty St is a 49 y.o. female who presents to the emergency department C/O vaginal bleeding onset 2 hours ago. Associated sx's include light headed, dizziness, and 5/10 abd pain. Pt was hospitalized for same 3 days ago and was discharged yesterday. Pt was given 6 units on blood during admission, HGB was 4.8. Pt denies fever, CP, SOB, nausea and any other sx's or complaints. Primary Care Provider: Shama Hilton MD   Specialist: Dr.Dedmond John Paul Kohli)    Past History     Past Medical History:   No past medical history on file. Past Surgical History:   No past surgical history on file. Family History:   No family history on file. Social History:   Social History   Substance Use Topics    Smoking status: Never Smoker    Smokeless tobacco: Never Used    Alcohol use No        Allergies:   No Known Allergies     Review of Systems   Review of Systems   Constitutional: Negative for fever. Respiratory: Negative for shortness of breath. Cardiovascular: Negative for chest pain. Gastrointestinal: Positive for abdominal pain. Negative for nausea. Genitourinary: Positive for vaginal bleeding. Neurological: Positive for dizziness and light-headedness. All other systems reviewed and are negative. Physical Exam  Vitals:    09/27/17 2045 09/27/17 2119 09/27/17 2152 09/27/17 2200   BP: 113/72 104/72 108/71 107/73   Pulse: 84 96 94 88   Resp: 21 17 22 21   Temp:       SpO2: 100%  100% 100%       Physical Exam   Nursing note and vitals reviewed.   Vital signs and nursing notes reviewed    CONSTITUTIONAL: Alert, in no apparent distress; well-developed; well-nourished. Thin middle aged female in mild pain. HEAD:  Normocephalic, atraumatic  EYES: PERRL; EOM's intact. ENTM: Nose: no rhinorrhea; Throat: no erythema or exudate, mucous membranes moist  Neck:  No JVD, supple without lymphadenopathy  RESP: Chest clear, equal breath sounds. CV: S1 and S2 WNL; No murmurs, gallops or rubs. GI: Normal bowel sounds, abdomen soft and suprapubic fullness and tenderness. No masses or organomegaly. : No costo-vertebral angle tenderness. Vaginal bleeding down left leg. Cervical mass. BACK:  Non-tender  UPPER EXT:  Normal inspection  LOWER EXT: No edema, no calf tenderness. Distal pulses intact. NEURO: CN intact, reflexes 2/4 and sym, strength 5/5 and sym, sensation intact. SKIN: No rashes; Normal for age and stage. PSYCH:  Alert and oriented, normal affect. Diagnostic Study Results     Labs -      Recent Results (from the past 12 hour(s))   CBC WITH AUTOMATED DIFF    Collection Time: 09/27/17  8:10 PM   Result Value Ref Range    WBC 21.1 (H) 4.6 - 13.2 K/uL    RBC 4.78 4.20 - 5.30 M/uL    HGB 12.5 12.0 - 16.0 g/dL    HCT 38.0 35.0 - 45.0 %    MCV 79.5 74.0 - 97.0 FL    MCH 26.2 24.0 - 34.0 PG    MCHC 32.9 31.0 - 37.0 g/dL    RDW 20.5 (H) 11.6 - 14.5 %    PLATELET 327 487 - 580 K/uL    MPV 9.2 9.2 - 11.8 FL    NEUTROPHILS 73 42 - 75 %    LYMPHOCYTES 5 (L) 20 - 51 %    MONOCYTES 10 (H) 2 - 9 %    EOSINOPHILS 12 (H) 0 - 5 %    BASOPHILS 0 0 - 3 %    ABS. NEUTROPHILS 15.4 (H) 1.8 - 8.0 K/UL    ABS. LYMPHOCYTES 1.1 0.8 - 3.5 K/UL    ABS. MONOCYTES 2.1 (H) 0 - 1.0 K/UL    ABS. EOSINOPHILS 2.5 (H) 0.0 - 0.4 K/UL    ABS.  BASOPHILS 0.0 0.0 - 0.1 K/UL    RBC COMMENTS ANISOCYTOSIS  2+        RBC COMMENTS MACROCYTOSIS  1+        RBC COMMENTS POIKILOCYTOSIS  1+        RBC COMMENTS TARGET CELLS  1+        DF MANUAL     METABOLIC PANEL, BASIC    Collection Time: 09/27/17  8:10 PM Result Value Ref Range    Sodium 136 136 - 145 mmol/L    Potassium 3.9 3.5 - 5.5 mmol/L    Chloride 100 100 - 108 mmol/L    CO2 28 21 - 32 mmol/L    Anion gap 8 3.0 - 18 mmol/L    Glucose 99 74 - 99 mg/dL    BUN 4 (L) 7.0 - 18 MG/DL    Creatinine 0.55 (L) 0.6 - 1.3 MG/DL    BUN/Creatinine ratio 7 (L) 12 - 20      GFR est AA >60 >60 ml/min/1.73m2    GFR est non-AA >60 >60 ml/min/1.73m2    Calcium 8.5 8.5 - 10.1 MG/DL   PROTHROMBIN TIME + INR    Collection Time: 09/27/17  8:10 PM   Result Value Ref Range    Prothrombin time 12.2 11.5 - 15.2 sec    INR 1.0 0.8 - 1.2     PTT    Collection Time: 09/27/17  8:10 PM   Result Value Ref Range    aPTT 25.2 23.0 - 36.4 SEC   TYPE & SCREEN    Collection Time: 09/27/17  9:43 PM   Result Value Ref Range    Crossmatch Expiration 09/30/2017     ABO/Rh(D) Altagracia Penn POSITIVE     Antibody screen NEG    HGB & HCT    Collection Time: 09/27/17 10:53 PM   Result Value Ref Range    HGB 9.7 (L) 12.0 - 16.0 g/dL    HCT 28.2 (L) 35.0 - 45.0 %       Radiologic Studies -  The following have been ordered and reviewed:  No orders to display           Medical Decision Making   I am the first provider for this patient. I reviewed the vital signs, available nursing notes, past medical history, past surgical history, family history and social history. Ddx: Severe anemia    Vital Signs-Reviewed the patient's vital signs. Patient Vitals for the past 12 hrs:   Temp Pulse Resp BP SpO2   09/27/17 2200 - 88 21 107/73 100 %   09/27/17 2152 - 94 22 108/71 100 %   09/27/17 2119 - 96 17 104/72 -   09/27/17 2045 - 84 21 113/72 100 %   09/27/17 1832 99.3 °F (37.4 °C) 99 22 110/72 100 %     Procedures:   Procedures    ED Course:  6:30 PM  Initial assessment performed. The patients presenting problems have been discussed, and they are in agreement with the care plan formulated and outlined with them. I have encouraged them to ask questions as they arise throughout their visit.     9:43 PM Pt continues to have increased vaginal bleeding. 9:56 PM Discussed patient's history, exam, and available diagnostics results with , Ob/Gyn, who wants vaginal packing placed. She will come in and see pt.     10:32 PM  is at bedside and wants repeat HGB. She will try to transfer pt to [facility]. Medications Given in the ED:  Medications   0.9% sodium chloride infusion 250 mL (not administered)   sodium chloride 0.9 % bolus infusion 1,000 mL (1,000 mL IntraVENous New Bag 9/27/17 2027)       Critical Care Time:   10:01 PM  I have spent 30 minutes of critical care time involved in lab review, consultations with specialist, family decision-making, and documentation. During this entire length of time I was immediately available to the patient. Critical Care: The reason for providing this level of medical care for this critically ill patient was due a critical illness that impaired one or more vital organ systems such that there was a high probability of imminent or life threatening deterioration in the patients condition. This care involved high complexity decision making to assess, manipulate, and support vital system functions, to treat this degreee vital organ system failure and to prevent further life threatening deterioration of the patients condition. Diagnosis   Clinical Impression:   1. Cervical mass    2. Vaginal bleeding      CONSULT NOTE:   10:53 PM   spoke with Meg Conde to facilitate transfer  Specialty: hematology/oncology  Discussed pt's hx, disposition, and available diagnostic and imaging results. Reviewed care plans. Consulting physician agrees taking pt at [place]. Written by Mary Sheridan ED Scribe, as dictated by Gareth Jeter MD    11:15 Per Dr.Dedmond Massey would feel comfortable if pt would spend more time here to make sure bleeding was under control and her hemoglobin had not dropped.  .     11:17 PM  Patient is being admitted to the hospital by . The results of their tests and reasons for their admission have been discussed with them and/or available family. They convey agreement and understanding for the need to be admitted and for their admission diagnosis. CONDITIONS ON ADMISSION:  Deep Vein Thrombosis is not present at the time of admission. Thrombosis is not present at the time of admission. Urinary Tract Infection is not present at the time of admission. Pneumonia is not present at the time of admission. MRSA is not present at the time of admission. Wound infection is not present at the time of admission. Pressure Ulcer is not present at the time of admission. Discussion: Pt presented with vaginal bleeding, recently been diagnosed with cervical cancer by . Her vitals were stable, HGB 12 and she has had large amount of bleeding here in ED filling up 2 bedpans. Her hemoglobin stopped to 9.7.  came to ED did vaginal packing, bleeding slowed down. Pt will be admitted for observation and then transferred to [facility] tomorrow. _______________________________   Attestations: This note is prepared by Susanne Dunbar , acting as a Scribe for Karolina Daley MD on 6:30 PM on 9/27/17 . Karolina Daley MD: The scribe's documentation has been prepared under my direction and personally reviewed by me in its entirety.   _______________________________

## 2017-09-27 NOTE — Clinical Note
Patient Class[de-identified] Observation [418] Type of Bed: Intensive Care [6] Reason for Observation: cervical cancer, vaginal bleeding Admitting Diagnosis: Excessive vaginal bleeding [[id number]] Admitting Physician: Estela Neff Attending Physician: Estela Neff

## 2017-09-28 VITALS
SYSTOLIC BLOOD PRESSURE: 110 MMHG | TEMPERATURE: 98.1 F | HEART RATE: 94 BPM | RESPIRATION RATE: 22 BRPM | OXYGEN SATURATION: 100 % | DIASTOLIC BLOOD PRESSURE: 67 MMHG

## 2017-09-28 LAB
ALBUMIN SERPL-MCNC: 2 G/DL (ref 3.4–5)
ALBUMIN/GLOB SERPL: 0.7 {RATIO} (ref 0.8–1.7)
ALP SERPL-CCNC: 35 U/L (ref 45–117)
ALT SERPL-CCNC: 19 U/L (ref 13–56)
ANION GAP SERPL CALC-SCNC: 7 MMOL/L (ref 3–18)
AST SERPL-CCNC: 23 U/L (ref 15–37)
BASOPHILS # BLD: 0 K/UL (ref 0–0.06)
BASOPHILS NFR BLD: 0 % (ref 0–2)
BILIRUB SERPL-MCNC: 0.6 MG/DL (ref 0.2–1)
BUN SERPL-MCNC: 6 MG/DL (ref 7–18)
BUN/CREAT SERPL: 14 (ref 12–20)
CALCIUM SERPL-MCNC: 7.5 MG/DL (ref 8.5–10.1)
CHLORIDE SERPL-SCNC: 106 MMOL/L (ref 100–108)
CO2 SERPL-SCNC: 26 MMOL/L (ref 21–32)
CREAT SERPL-MCNC: 0.44 MG/DL (ref 0.6–1.3)
DIFFERENTIAL METHOD BLD: ABNORMAL
EOSINOPHIL # BLD: 2.4 K/UL (ref 0–0.4)
EOSINOPHIL NFR BLD: 13 % (ref 0–5)
ERYTHROCYTE [DISTWIDTH] IN BLOOD BY AUTOMATED COUNT: 20.7 % (ref 11.6–14.5)
GLOBULIN SER CALC-MCNC: 2.7 G/DL (ref 2–4)
GLUCOSE SERPL-MCNC: 103 MG/DL (ref 74–99)
HCT VFR BLD AUTO: 27.1 % (ref 35–45)
HCT VFR BLD AUTO: 27.4 % (ref 35–45)
HGB BLD-MCNC: 9.1 G/DL (ref 12–16)
HGB BLD-MCNC: 9.1 G/DL (ref 12–16)
LYMPHOCYTES # BLD: 1.7 K/UL (ref 0.9–3.6)
LYMPHOCYTES NFR BLD: 9 % (ref 21–52)
MAGNESIUM SERPL-MCNC: 1.8 MG/DL (ref 1.6–2.6)
MCH RBC QN AUTO: 26.6 PG (ref 24–34)
MCHC RBC AUTO-ENTMCNC: 33.2 G/DL (ref 31–37)
MCV RBC AUTO: 80.1 FL (ref 74–97)
MONOCYTES # BLD: 1.3 K/UL (ref 0.05–1.2)
MONOCYTES NFR BLD: 7 % (ref 3–10)
NEUTS SEG # BLD: 13.4 K/UL (ref 1.8–8)
NEUTS SEG NFR BLD: 71 % (ref 40–73)
PLATELET # BLD AUTO: 273 K/UL (ref 135–420)
PMV BLD AUTO: 9.4 FL (ref 9.2–11.8)
POTASSIUM SERPL-SCNC: 3.8 MMOL/L (ref 3.5–5.5)
PROT SERPL-MCNC: 4.7 G/DL (ref 6.4–8.2)
RBC # BLD AUTO: 3.42 M/UL (ref 4.2–5.3)
SODIUM SERPL-SCNC: 139 MMOL/L (ref 136–145)
WBC # BLD AUTO: 18.9 K/UL (ref 4.6–13.2)

## 2017-09-28 PROCEDURE — 74011250636 HC RX REV CODE- 250/636: Performed by: OBSTETRICS & GYNECOLOGY

## 2017-09-28 PROCEDURE — 80053 COMPREHEN METABOLIC PANEL: CPT | Performed by: OBSTETRICS & GYNECOLOGY

## 2017-09-28 PROCEDURE — 85025 COMPLETE CBC W/AUTO DIFF WBC: CPT | Performed by: OBSTETRICS & GYNECOLOGY

## 2017-09-28 PROCEDURE — 85018 HEMOGLOBIN: CPT | Performed by: OBSTETRICS & GYNECOLOGY

## 2017-09-28 PROCEDURE — 83735 ASSAY OF MAGNESIUM: CPT | Performed by: OBSTETRICS & GYNECOLOGY

## 2017-09-28 PROCEDURE — 36415 COLL VENOUS BLD VENIPUNCTURE: CPT | Performed by: OBSTETRICS & GYNECOLOGY

## 2017-09-28 RX ORDER — SODIUM CHLORIDE 0.9 % (FLUSH) 0.9 %
5-10 SYRINGE (ML) INJECTION EVERY 8 HOURS
Status: DISCONTINUED | OUTPATIENT
Start: 2017-09-28 | End: 2017-09-28 | Stop reason: HOSPADM

## 2017-09-28 RX ORDER — ONDANSETRON 2 MG/ML
4 INJECTION INTRAMUSCULAR; INTRAVENOUS
Status: DISCONTINUED | OUTPATIENT
Start: 2017-09-28 | End: 2017-09-28 | Stop reason: HOSPADM

## 2017-09-28 RX ORDER — HYDROMORPHONE HYDROCHLORIDE 2 MG/1
2-4 TABLET ORAL
Status: DISCONTINUED | OUTPATIENT
Start: 2017-09-28 | End: 2017-09-28 | Stop reason: HOSPADM

## 2017-09-28 RX ORDER — SODIUM CHLORIDE 0.9 % (FLUSH) 0.9 %
5-10 SYRINGE (ML) INJECTION AS NEEDED
Status: DISCONTINUED | OUTPATIENT
Start: 2017-09-28 | End: 2017-09-28 | Stop reason: HOSPADM

## 2017-09-28 RX ORDER — DIPHENHYDRAMINE HYDROCHLORIDE 50 MG/ML
12.5 INJECTION, SOLUTION INTRAMUSCULAR; INTRAVENOUS
Status: DISCONTINUED | OUTPATIENT
Start: 2017-09-28 | End: 2017-09-28 | Stop reason: HOSPADM

## 2017-09-28 RX ORDER — SODIUM CHLORIDE 9 MG/ML
125 INJECTION, SOLUTION INTRAVENOUS CONTINUOUS
Status: DISCONTINUED | OUTPATIENT
Start: 2017-09-28 | End: 2017-09-28 | Stop reason: HOSPADM

## 2017-09-28 RX ORDER — ACETAMINOPHEN 325 MG/1
650 TABLET ORAL
Status: DISCONTINUED | OUTPATIENT
Start: 2017-09-28 | End: 2017-09-28 | Stop reason: HOSPADM

## 2017-09-28 RX ADMIN — SODIUM CHLORIDE 125 ML/HR: 900 INJECTION, SOLUTION INTRAVENOUS at 01:00

## 2017-09-28 RX ADMIN — Medication 10 ML: at 01:00

## 2017-09-28 NOTE — PROGRESS NOTES
Admit date: 9/27/2017        ASSESSMENT/PLAN  Vu Roxie Snellen is a 49 y. 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 Planning initiation of RT urgently due to bleeding. GI - no issues. Tolerated regular diet without n/v last pm.   FEN - labs pending this am  ID - No signs of sepsis. Leukocytosis but Afebrile and urine cx 9/25 NGTD  Renal - normal UOP and normal creatinine  Heme - acute blood loss anemia. Hgb dropped from 12.5 to 9.7. Vaginal packing in place and bleeding now controlled with tamponade. DVT Prophylaxis - MARIETTA hose and SCDs  CV - hemodynamically stable. Pulm - no issues  Disp - will proceed with transfer now to Dr. Dawit Stiles at [facility]      SUBJECTIVE  No complaints this am. Admits to pelvic pressure and cramping. No n/v, no chest pain. OBJECTIVE  Physical Exam:  Patient Vitals for the past 24 hrs:   BP Temp Pulse Resp SpO2   09/28/17 0015 114/71 - 85 21 100 %   09/28/17 0000 109/73 97.5 °F (36.4 °C) 88 20 99 %   09/27/17 2200 107/73 - 88 21 100 %   09/27/17 2152 108/71 - 94 22 100 %   09/27/17 2119 104/72 - 96 17 -   09/27/17 2045 113/72 - 84 21 100 %   09/27/17 1832 110/72 99.3 °F (37.4 °C) 99 22 100 %         Intake/Output Summary (Last 24 hours) at 09/28/17 0443  Last data filed at 09/28/17 0022   Gross per 24 hour   Intake                0 ml   Output              750 ml   Net             -750 ml        General - resting in no acute distress, alert and oriented  HEENT - within normal limits  Heart - regular rate and rhythm  Lungs - clear to auscultation  Abdomen - soft, nontender, nondistended, normal bowel sounds  Vaginal - packing in place and blood stained. Minimal blood on pad.    Extremities - no edema    Labs  CBC  Recent Labs      09/28/17   0343  09/27/17   2253  09/27/17 2010 09/25/17   1910   WBC   --    --   21.1*  20.2*   HCT  27.1*  28.2*  38.0  39.1   MCV   --    --   79.5  77.4   MONOS   --    --   10*   -- EOS   --    --   12*   --    BASOS   --    --   0   --         CMP  Recent Labs      09/27/17 2010   NA  136   CO2  28   BUN  4*        Lab Results   Component Value Date/Time    Glucose 99 09/27/2017 08:10 PM    Glucose (POC) 101 09/26/2017 06:21 AM          Current Hospital Medications    Current Facility-Administered Medications:     0.9% sodium chloride infusion, 125 mL/hr, IntraVENous, CONTINUOUS, Pattie Dumont MD, Last Rate: 125 mL/hr at 09/28/17 0100, 125 mL/hr at 09/28/17 0100    sodium chloride (NS) flush 5-10 mL, 5-10 mL, IntraVENous, Q8H, Jenny Badillo MD, 10 mL at 09/28/17 0100    sodium chloride (NS) flush 5-10 mL, 5-10 mL, IntraVENous, PRN, Pattie Dumont MD    diphenhydrAMINE (BENADRYL) injection 12.5 mg, 12.5 mg, IntraVENous, Q4H PRN, Pattie Dumont MD    benzocaine-menthol (CEPACOL) lozenge 1 Lozenge, 1 Lozenge, Oral, PRN, Pattie Dumont MD    acetaminophen (TYLENOL) tablet 650 mg, 650 mg, Oral, Q4H PRN, Pattie Dumont MD    ondansetron (ZOFRAN) injection 4 mg, 4 mg, IntraVENous, Q4H PRN, Pattie Dumont MD    HYDROmorphone (DILAUDID) tablet 2-4 mg, 2-4 mg, Oral, Q4H PRN, Pattie Dumont MD    0.9% sodium chloride infusion 250 mL, 250 mL, IntraVENous, PRN, MD Pattie Krause MD   Pager  [phone]  Cell [phone]

## 2017-09-28 NOTE — PROGRESS NOTES
@2357 Pt admitted to from ED awake alert oriented x4 , denies pain at present, remains on room air SPO2 100%. Vital signs stable. Virginal pack remains in situ no new bleeding observed . Pt is [place] and  at bedside. Assessment done and documented in appropriate flow sheets. Dr Cris Baez at bedside orders carried out as prescribed. Nursing management continues. @0200 no changes pt continues to deny pain , asleep intermittently. Vital signs stable nursing care continues. @0400 no changes care continues.

## 2017-09-28 NOTE — ROUTINE PROCESS
TRANSFER - OUT REPORT:    Verbal report given to Dai Moralez RN(name) on María Orozco  being transferred to ICU(unit) for routine progression of care       Report consisted of patients Situation, Background, Assessment and   Recommendations(SBAR). Information from the following report(s) SBAR, ED Summary and MAR was reviewed with the receiving nurse. Lines:   Peripheral IV 09/27/17 Left Antecubital (Active)   Site Assessment Clean, dry, & intact 9/27/2017  8:23 PM   Phlebitis Assessment 0 9/27/2017  8:23 PM   Infiltration Assessment 0 9/27/2017  8:23 PM   Dressing Status Clean, dry, & intact 9/27/2017  8:23 PM   Dressing Type Tape;Transparent 9/27/2017  8:23 PM   Hub Color/Line Status Blue;Capped;Flushed 9/27/2017  8:23 PM   Action Taken Blood drawn 9/27/2017  8:23 PM   Alcohol Cap Used Yes 9/27/2017  8:23 PM        Opportunity for questions and clarification was provided.       Patient transported with:   Monitor   Registered RN

## 2017-09-28 NOTE — PROGRESS NOTES
Problem: Falls - Risk of  Goal: *Absence of Falls  Document Pete Fall Risk and appropriate interventions in the flowsheet.    Fall Risk Interventions:

## 2017-09-28 NOTE — ED NOTES
Assisted Dr. Hetal Magdaleno with vaginal packing. 16-Indonesian Jay inserted by Dr. Hetal Magdaleno. Repeat H&H drawn per orders.

## 2017-09-28 NOTE — ED NOTES
Assumed care of patient. Patient presents complaining of vaginal bleeding. Patient reports she was admitted to the ICU at this facility over the past several days for low H&H due to vaginal bleeding for approximately three months. Patient was discharged yesterday from this facility and states bleeding onset again approximately two hours prior to arrival. Patient also endorses abdominal pain and dizziness. Patient denies any chest pain or shortness of breath. Patient in no apparent distress at this time. Vital signs stable. Liter bolus of NS infusing at this time.

## 2017-09-28 NOTE — PROGRESS NOTES
@1948 Pt admitted to from ED awake alert oriented x4 , denies pain at present, remains on room air SPO2 100%. Vital signs stable. Virginal pack remains in situ no new bleeding observed . Pt is [place] and  at bedside. Assessment done and documented in appropriate flow sheets. Dr Shiloh Dickson at bedside orders carried out as prescribed. Nursing management continues. @0200 no changes pt continues to deny pain , asleep intermittently. Vital signs stable nursing care continues. @0400 no changes care continues. @3339 Dr Shiloh Dickson at bedside and reviewed labs verbalized that pt is stable and would like to transfer pt to [address] [place] [address]. Transfer center at [address] [place] [address] was called. Both Dr Shiloh Dickson and myself spoke to [place], who called back with a bed assignment . @8929  TRANSFER - OUT REPORT:    Verbal report given to Evelyn BURNS (name) on Sheila Yuan  being transferred to  [facility] (unit) for routine progression of care       Report consisted of patients Situation, Background, Assessment and   Recommendations(SBAR). Information from the following report(s) SBAR, Kardex, Intake/Output, MAR, Recent Results and Med Rec Status was reviewed with the receiving nurse. Lines:   Peripheral IV 09/27/17 Left Antecubital (Active)   Site Assessment Clean, dry, & intact 9/28/2017  4:00 AM   Phlebitis Assessment 0 9/28/2017  4:00 AM   Infiltration Assessment 0 9/28/2017  4:00 AM   Dressing Status Clean, dry, & intact 9/28/2017  4:00 AM   Dressing Type Transparent;Tape 9/28/2017  4:00 AM   Hub Color/Line Status Blue; Infusing 9/28/2017  4:00 AM   Action Taken Open ports on tubing capped 9/28/2017  4:00 AM   Alcohol Cap Used Yes 9/28/2017  4:00 AM        Opportunity for questions and clarification was provided.       Patient transported with:   Monitor ACLS ambulance on 0.9% NACL @125 MLS /hr        @0556 pt [facility],  at bedside, no belongings sent with ambulance ,  took pocket book and clothes home.

## 2017-09-28 NOTE — H&P
[facility] GYNECOLOGIC ONCOLOGY   [address] [facility], [address] [facility]  [phone]  Sonido Callaway MD        Patient ID:  Name:                                  Olga Pollock  MRN:                                   [id number]  :                                   1970/47 y.o. Date:                                    17       HISTORY OF PRESENT ILLNESS:  Olga Pollock is a 49 y.o.   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 She is status post EUA, Cysto, and Procto with cervical biopsy for diagnosis. After the procedure she was observed for further bleeding. Packing was removed and patient ambulated, voided and had BM without increased bleeding. She was discharged with plan for PET/CT and office follow up in preparation for primary Cisplatin/RT. Patient presented this PM to [facility] ED with recurrent heavy bleeding x 2 hours. Upon presentation to the ED Hgb was 12.5 with VSS. While in ED EBL was 400cc prior to examination with placement of hemostatic agents (gel foam and snow) and vaginal pcking. Repeat Hgb after bleeding is 9.7. Jay catheter was also placed.     PATHOLOGY:  2017 cervical biopsies. Squamous Cell Carcinoma     LABS:  Hgb 9.7  Creatinine 0.55     IMAGING:  EXAM: CT of the Abdomen and Pelvis     INDICATION: Cervical cancer-preoperative evaluation, patient presented to the  emergency room with abdominal vaginal bleeding and a large cervical mass     COMPARISON: None.     TECHNIQUE: Axial CT imaging of the abdomen and pelvis was performed with oral  and intravenous contrast. Multiplanar reformats were generated. Dose reduction  techniques used:  Automated exposure control, adjustment of the mAs and/or kVp  according to patient's size, and iterative reconstruction techniques.     _______________     FINDINGS:     LOWER CHEST: Minimal bibasilar dependent atelectasis with no discrete mass or  nodule identified in either lung base. No pleural or pericardial effusion.     LIVER, BILIARY: Very subtle ill-defined hyperattenuating focus (axial image  18/coronal image 26) in the subcapsular aspect of hepatic segment 8, measuring  approximately 8 x 5 mm. No biliary dilation. Gallbladder is unremarkable.     PANCREAS: Normal.     SPLEEN: Normal.     ADRENALS: Normal.     KIDNEYS: Symmetric enhancing bilateral kidneys without hydronephrosis,  perinephric fluid or perinephric induration. No hydroureter.     LYMPH NODES: Enlarged left proximal external iliac chain lymph node measuring  1.5 x 1.9 cm (image 81). No pathologically enlarged abdominal or right pelvic  adenopathy.     GASTROINTESTINAL TRACT: No bowel dilation or wall thickening.     PELVIC ORGANS: Large heterogeneous enhancing pelvic mass, in keeping with  history of cervical carcinoma and, measuring approximately 8.2 x 7.6 x 8.4 cm  (AP by transverse by superior/inferior). Heterogeneous hyperdense material is  seen filling the vaginal vault, which could be in keeping with packing material  and hemorrhage. The uterus is superiorly displaced by the cervical positioned  mass.     There is loss of fat planes posteriorly with the rectal vault and anteriorly  with the moderately fluid-filled bladder. No discretely identified bladder or  rectal metastatic extension.     A Jay catheter balloon is present within the bladder lumen. Right adnexal cystic lesion measuring 2.4 x 1.5 cm, in keeping with ovarian  etiology.     VASCULATURE: Unremarkable.     BONES: No acute or aggressive osseous abnormalities identified.     OTHER: No pneumoperitoneum.     _______________     IMPRESSION  IMPRESSION:        1. Large heterogeneous enhancing solid and partially attenuating/cystic pelvic  mass, measuring 8.2 x 7.6 x 8.4 cm, which is in keeping with provided history of  cervical carcinoma.        2.  Single visualized enlarged left proximal external iliac chain lymph node, in  keeping with metastatic disease. No additional findings of pathologically  enlarged adenopathy.     3. Ill-defined hyperintense peripheral right lobe of liver segment 8 lesion. Diagnostic considerations would include benign etiology such as flash fill  hemangioma, less likely metastatic disease.     COMPREHENSIVE ROS:  Constitutional: Weight Change and Fatigue  Skin: neg  Allergy/Imm: neg  Eyes/Head: Dizziness  Ears/Nose/Mouth: neg  Respiratory: neg  Cardiovascular: neg  Gastrointestinal: neg  Genito-Urinary: Vaginal Bleeding, heavy  Endocrine: neg  Muscular System:Back Pain  Neuro: neg  Psych: neg  Blood/Lymph: Anemia  All other systems reviewed and are negative.         PROBLEM LIST:  Active Hospital Problems    Diagnosis Date Noted    Excessive vaginal bleeding 09/27/2017    Malignant neoplasm of exocervix ([address] 09/27/2017         Family Hx:              History reviewed. No pertinent family history.     Social Hx:           Social History   Substance Use Topics    Smoking status: Never Smoker    Smokeless tobacco: Never Used    Alcohol use No         Surgical Hx:  EUA/Cysto/Procto/cervical biopsies 9/26/2017 at [facility]      Past Medical Hx:              History reviewed.  No pertinent past medical history.     Medications:             Current Facility-Administered Medications   Medication Dose Route Frequency    0.9% sodium chloride infusion 250 mL  250 mL IntraVENous PRN    0.9% sodium chloride infusion 250 mL  250 mL IntraVENous PRN    acetaminophen (OFIRMEV) infusion 1,000 mg  1,000 mg IntraVENous Q6H    0.9% sodium chloride infusion 250 mL  250 mL IntraVENous PRN    diphenhydrAMINE (BENADRYL) injection 50 mg  50 mg IntraVENous Q4H PRN         Allergies:                        No Known Allergies        OBJECTIVE:     Physical Exam  Visit Vitals    /73    Pulse 88    Temp 99.3 °F (37.4 °C)    Resp 21    LMP 09/12/2017 (Approximate)    SpO2 100%       GENERAL GAVINO: in no apparent distress and well developed and well nourished lying flat on stretcher in ED   HEENT: NCAT,EOMI,PERRL   RESPIRATORY: lungs clear to auscultation, no wheezing, rhonchi, or crackles   CARDIOVASC: Regular rate and rhythm or without murmur or extra heart sounds   GASTROINT: soft, non-tender, non-distended, without masses or organomegaly, normal active bowel sounds   MUSCULOSKEL: no joint tenderness, deformity or swelling   INTEGUMENT:  warm and dry, no rashes or lesions   EXTREMITIES: extremities normal, atraumatic, no cyanosis or edema   PELVIC: Bright red bleeding with clots in vagina, 8 cm cervical mass with anterior vaginal wall involvement, right parametrial and left pelvic sidewall extension   RECTAL: Normal sphincter tone   ALBERT SURVEY: Cervical, supraclavicular, axillary and inguinal nodes normal.   NEURO: Grossly normal            IMPRESSION/PLAN:  Stage IIIB Squamous Cell Carcinoma of the Exocervix  Recurrent heavy vaginal bleeding with acute blood loss anemia  Hemostatic agents and vaginal packing placed to tamponade bleeding from tumor. Type and cross ordered. Will monitor vaginal bleeding, VS, and Hgb overnight in ICU here. Will need to initiate radiation therapy urgently to control bleeding. Discussed with Dr. Kristin Flores. He is prepared to begin treatment upon transfer to his facility, [facility]. Spoke with Dr. Wenceslao Closs, Medical Oncology, [facility] who has accepted patient in transfer. Plan to observe patient in ICU overnight and transfer to [facility] in am.   Elevated serum glucose 9/24/17, HgbA1C 6.8. No PMH.       Jenny Badillo MD  Gynecologic Oncology

## 2017-10-01 LAB
ABO + RH BLD: NORMAL
BLD PROD TYP BPU: NORMAL
BLD PROD TYP BPU: NORMAL
BLOOD BANK CMNT PATIENT-IMP: NORMAL
BLOOD GROUP ANTIBODIES SERPL: NORMAL
BPU ID: NORMAL
BPU ID: NORMAL
CROSSMATCH RESULT,%XM: NORMAL
CROSSMATCH RESULT,%XM: NORMAL
SPECIMEN EXP DATE BLD: NORMAL
STATUS OF UNIT,%ST: NORMAL
STATUS OF UNIT,%ST: NORMAL
UNIT DIVISION, %UDIV: 0
UNIT DIVISION, %UDIV: 0

## 2017-10-03 NOTE — DISCHARGE SUMMARY
GYN Discharge Summary                        Patient ID:  Austin L[age] y.o. Admission Date: 9/27/2017  Discharge Date:  9/28/2017                                                 Attending: Molly Sandoval MD   PCP: Shama Hilton MD                                                                                               Reason for admission:vaginal bleeding    Discharge  diagnosis: Principal Problem:    Excessive vaginal bleeding (9/27/2017)    Active Problems:    Malignant neoplasm of exocervix ([address] (9/27/2017)      Associated Conditions:        Patient Active Problem List   Diagnosis Code    Vaginal bleeding N93.9    Cervical mass N88.8    Excessive vaginal bleeding N92.0    Malignant neoplasm of exocervix ([address] C53.1            No past medical history on file. Operative Procedure: Vaginal packing at the bedside in the ED. Complications:  none                   Transfusion:  2 units pRBCs    Hospital Course: Patient presented to the ED with complaint of heavy vaginal bleeding. The nurse witnessed bleeding equivalent to 2 bed pains worth. Hgb dropped from 12.5 to 9.7. Hemostatic agents and vaginal packing were placed. Patient was monitored and bleeding was sufficiently tamponaded. Repeat Hgb was 9.1. Patient was admitted to the ICU and monitored overnight. There was no bleeding through the packing. The repeat Hgb in the am was 9.1. I spoke with Dr. Blanka Zimmerman, Radiation Oncology at [facility], and he agrees that urgent high dose pelvic radiation is indicated to control bleeding and recommends patient be transferred to [facility] to begin radiation. I have spoken with Dr. Ayan Lai, [facility] medical oncology, as he agrees to accept patient in transfer to oncology floor at [facility].  Arrangements made and patient was transferred early am.    Condition at discharge: stable and Afebrile    Labs:  Lab Results   Component Value Date/Time    WBC 18.9 09/28/2017 03:44 AM    HGB 9.1 09/28/2017 03:44 AM    HCT 27.4 09/28/2017 03:44 AM    PLATELET 915 01/66/9203 03:44 AM    MCV 80.1 09/28/2017 03:44 AM     Lab Results   Component Value Date/Time    Sodium 139 09/28/2017 03:44 AM    Potassium 3.8 09/28/2017 03:44 AM    Chloride 106 09/28/2017 03:44 AM    CO2 26 09/28/2017 03:44 AM    Anion gap 7 09/28/2017 03:44 AM    Glucose 103 09/28/2017 03:44 AM    BUN 6 09/28/2017 03:44 AM    Creatinine 0.44 09/28/2017 03:44 AM    BUN/Creatinine ratio 14 09/28/2017 03:44 AM    GFR est AA >60 09/28/2017 03:44 AM    GFR est non-AA >60 09/28/2017 03:44 AM         Cannot display discharge medications since this patient is not currently admitted. Patient Instructions:        Disposition: Transferred to Dr. Calixto Casiano at [facility]. Follow-up:  Follow up with Dr. Steve Conte after discharge from [facility].     Author:   Jesica Wild MD  Gynecologic Oncology  10/3/2017  4:40 PM

## 2017-11-10 ENCOUNTER — HOSPITAL ENCOUNTER (OUTPATIENT)
Dept: PREADMISSION TESTING | Age: 47
Discharge: HOME | End: 2017-11-10
Payer: SUBSIDIZED

## 2017-11-10 LAB
HCT VFR BLD AUTO: 28.9 % (ref 35–45)
HGB BLD-MCNC: 9.5 G/DL (ref 12–16)

## 2017-11-10 PROCEDURE — 36415 COLL VENOUS BLD VENIPUNCTURE: CPT | Performed by: OBSTETRICS & GYNECOLOGY

## 2017-11-10 PROCEDURE — 85018 HEMOGLOBIN: CPT | Performed by: OBSTETRICS & GYNECOLOGY

## 2017-11-13 ENCOUNTER — ANESTHESIA EVENT (OUTPATIENT)
Dept: SURGERY | Age: 47
End: 2017-11-13
Payer: SUBSIDIZED

## 2017-11-14 ENCOUNTER — ANESTHESIA (OUTPATIENT)
Dept: SURGERY | Age: 47
End: 2017-11-14
Payer: SUBSIDIZED

## 2017-11-14 ENCOUNTER — HOSPITAL ENCOUNTER (OUTPATIENT)
Age: 47
Setting detail: OUTPATIENT SURGERY
Discharge: HOME | End: 2017-11-14
Attending: OBSTETRICS & GYNECOLOGY | Admitting: OBSTETRICS & GYNECOLOGY
Payer: SUBSIDIZED

## 2017-11-14 VITALS
WEIGHT: 90.9 LBS | TEMPERATURE: 98.8 F | DIASTOLIC BLOOD PRESSURE: 72 MMHG | HEART RATE: 87 BPM | HEIGHT: 60 IN | RESPIRATION RATE: 15 BRPM | OXYGEN SATURATION: 100 % | BODY MASS INDEX: 17.85 KG/M2 | SYSTOLIC BLOOD PRESSURE: 112 MMHG

## 2017-11-14 LAB — HCG SERPL QL: NEGATIVE

## 2017-11-14 PROCEDURE — 74011000250 HC RX REV CODE- 250

## 2017-11-14 PROCEDURE — 74011250636 HC RX REV CODE- 250/636: Performed by: SPECIALIST

## 2017-11-14 PROCEDURE — 77030032490 HC SLV COMPR SCD KNE COVD -B: Performed by: OBSTETRICS & GYNECOLOGY

## 2017-11-14 PROCEDURE — 77030018832 HC SOL IRR H20 ICUM -A: Performed by: OBSTETRICS & GYNECOLOGY

## 2017-11-14 PROCEDURE — 77030005537 HC CATH URETH BARD -A: Performed by: OBSTETRICS & GYNECOLOGY

## 2017-11-14 PROCEDURE — 77030018836 HC SOL IRR NACL ICUM -A: Performed by: OBSTETRICS & GYNECOLOGY

## 2017-11-14 PROCEDURE — 74011250636 HC RX REV CODE- 250/636

## 2017-11-14 PROCEDURE — 36415 COLL VENOUS BLD VENIPUNCTURE: CPT | Performed by: OBSTETRICS & GYNECOLOGY

## 2017-11-14 PROCEDURE — 77030002986 HC SUT PROL J&J -A: Performed by: OBSTETRICS & GYNECOLOGY

## 2017-11-14 PROCEDURE — 76210000026 HC REC RM PH II 1 TO 1.5 HR: Performed by: OBSTETRICS & GYNECOLOGY

## 2017-11-14 PROCEDURE — 74011250636 HC RX REV CODE- 250/636: Performed by: OBSTETRICS & GYNECOLOGY

## 2017-11-14 PROCEDURE — 76210000017 HC OR PH I REC 1.5 TO 2 HR: Performed by: OBSTETRICS & GYNECOLOGY

## 2017-11-14 PROCEDURE — 77010033678 HC OXYGEN DAILY

## 2017-11-14 PROCEDURE — 74011000258 HC RX REV CODE- 258: Performed by: OBSTETRICS & GYNECOLOGY

## 2017-11-14 PROCEDURE — 77030020782 HC GWN BAIR PAWS FLX 3M -B: Performed by: OBSTETRICS & GYNECOLOGY

## 2017-11-14 PROCEDURE — 76060000033 HC ANESTHESIA 1 TO 1.5 HR: Performed by: OBSTETRICS & GYNECOLOGY

## 2017-11-14 PROCEDURE — 76010000149 HC OR TIME 1 TO 1.5 HR: Performed by: OBSTETRICS & GYNECOLOGY

## 2017-11-14 PROCEDURE — 84703 CHORIONIC GONADOTROPIN ASSAY: CPT | Performed by: OBSTETRICS & GYNECOLOGY

## 2017-11-14 PROCEDURE — 77030019927 HC TBNG IRR CYSTO BAXT -A: Performed by: OBSTETRICS & GYNECOLOGY

## 2017-11-14 RX ORDER — FENTANYL CITRATE 50 UG/ML
INJECTION, SOLUTION INTRAMUSCULAR; INTRAVENOUS AS NEEDED
Status: DISCONTINUED | OUTPATIENT
Start: 2017-11-14 | End: 2017-11-14 | Stop reason: HOSPADM

## 2017-11-14 RX ORDER — SODIUM CHLORIDE, SODIUM LACTATE, POTASSIUM CHLORIDE, CALCIUM CHLORIDE 600; 310; 30; 20 MG/100ML; MG/100ML; MG/100ML; MG/100ML
50 INJECTION, SOLUTION INTRAVENOUS CONTINUOUS
Status: DISCONTINUED | OUTPATIENT
Start: 2017-11-14 | End: 2017-11-14 | Stop reason: HOSPADM

## 2017-11-14 RX ORDER — HYDROMORPHONE HYDROCHLORIDE 2 MG/ML
0.5 INJECTION, SOLUTION INTRAMUSCULAR; INTRAVENOUS; SUBCUTANEOUS
Status: DISCONTINUED | OUTPATIENT
Start: 2017-11-14 | End: 2017-11-14 | Stop reason: HOSPADM

## 2017-11-14 RX ORDER — LIDOCAINE HYDROCHLORIDE 20 MG/ML
INJECTION, SOLUTION EPIDURAL; INFILTRATION; INTRACAUDAL; PERINEURAL AS NEEDED
Status: DISCONTINUED | OUTPATIENT
Start: 2017-11-14 | End: 2017-11-14 | Stop reason: HOSPADM

## 2017-11-14 RX ORDER — NALOXONE HYDROCHLORIDE 0.4 MG/ML
0.1 INJECTION, SOLUTION INTRAMUSCULAR; INTRAVENOUS; SUBCUTANEOUS AS NEEDED
Status: DISCONTINUED | OUTPATIENT
Start: 2017-11-14 | End: 2017-11-14 | Stop reason: HOSPADM

## 2017-11-14 RX ORDER — FLUMAZENIL 0.1 MG/ML
0.2 INJECTION INTRAVENOUS
Status: DISCONTINUED | OUTPATIENT
Start: 2017-11-14 | End: 2017-11-14 | Stop reason: HOSPADM

## 2017-11-14 RX ORDER — SODIUM CHLORIDE 0.9 % (FLUSH) 0.9 %
5-10 SYRINGE (ML) INJECTION AS NEEDED
Status: DISCONTINUED | OUTPATIENT
Start: 2017-11-14 | End: 2017-11-14 | Stop reason: HOSPADM

## 2017-11-14 RX ORDER — PROPOFOL 10 MG/ML
INJECTION, EMULSION INTRAVENOUS AS NEEDED
Status: DISCONTINUED | OUTPATIENT
Start: 2017-11-14 | End: 2017-11-14 | Stop reason: HOSPADM

## 2017-11-14 RX ORDER — HYDROMORPHONE HYDROCHLORIDE 2 MG/1
2 TABLET ORAL
Qty: 30 TAB | Refills: 0 | Status: SHIPPED | OUTPATIENT
Start: 2017-11-14 | End: 2018-07-23

## 2017-11-14 RX ORDER — DEXTROSE 50 % IN WATER (D50W) INTRAVENOUS SYRINGE
25-50 AS NEEDED
Status: DISCONTINUED | OUTPATIENT
Start: 2017-11-14 | End: 2017-11-14 | Stop reason: HOSPADM

## 2017-11-14 RX ORDER — OXYCODONE AND ACETAMINOPHEN 5; 325 MG/1; MG/1
1 TABLET ORAL AS NEEDED
Status: DISCONTINUED | OUTPATIENT
Start: 2017-11-14 | End: 2017-11-14 | Stop reason: HOSPADM

## 2017-11-14 RX ORDER — MAGNESIUM SULFATE 100 %
4 CRYSTALS MISCELLANEOUS AS NEEDED
Status: DISCONTINUED | OUTPATIENT
Start: 2017-11-14 | End: 2017-11-14 | Stop reason: HOSPADM

## 2017-11-14 RX ORDER — EPHEDRINE SULFATE/0.9% NACL/PF 25 MG/5 ML
SYRINGE (ML) INTRAVENOUS AS NEEDED
Status: DISCONTINUED | OUTPATIENT
Start: 2017-11-14 | End: 2017-11-14 | Stop reason: HOSPADM

## 2017-11-14 RX ORDER — FENTANYL CITRATE 50 UG/ML
50 INJECTION, SOLUTION INTRAMUSCULAR; INTRAVENOUS AS NEEDED
Status: DISCONTINUED | OUTPATIENT
Start: 2017-11-14 | End: 2017-11-14 | Stop reason: HOSPADM

## 2017-11-14 RX ORDER — ONDANSETRON 2 MG/ML
INJECTION INTRAMUSCULAR; INTRAVENOUS AS NEEDED
Status: DISCONTINUED | OUTPATIENT
Start: 2017-11-14 | End: 2017-11-14 | Stop reason: HOSPADM

## 2017-11-14 RX ORDER — FENTANYL CITRATE 50 UG/ML
50 INJECTION, SOLUTION INTRAMUSCULAR; INTRAVENOUS AS NEEDED
Status: DISCONTINUED | OUTPATIENT
Start: 2017-11-14 | End: 2017-11-14 | Stop reason: SDUPTHER

## 2017-11-14 RX ORDER — KETOROLAC TROMETHAMINE 30 MG/ML
INJECTION, SOLUTION INTRAMUSCULAR; INTRAVENOUS AS NEEDED
Status: DISCONTINUED | OUTPATIENT
Start: 2017-11-14 | End: 2017-11-14 | Stop reason: HOSPADM

## 2017-11-14 RX ORDER — FENTANYL CITRATE 50 UG/ML
25 INJECTION, SOLUTION INTRAMUSCULAR; INTRAVENOUS
Status: DISPENSED | OUTPATIENT
Start: 2017-11-14 | End: 2017-11-14

## 2017-11-14 RX ORDER — SODIUM CHLORIDE, SODIUM LACTATE, POTASSIUM CHLORIDE, CALCIUM CHLORIDE 600; 310; 30; 20 MG/100ML; MG/100ML; MG/100ML; MG/100ML
125 INJECTION, SOLUTION INTRAVENOUS CONTINUOUS
Status: DISCONTINUED | OUTPATIENT
Start: 2017-11-14 | End: 2017-11-14 | Stop reason: HOSPADM

## 2017-11-14 RX ORDER — ONDANSETRON 2 MG/ML
4 INJECTION INTRAMUSCULAR; INTRAVENOUS ONCE
Status: DISCONTINUED | OUTPATIENT
Start: 2017-11-14 | End: 2017-11-14 | Stop reason: HOSPADM

## 2017-11-14 RX ORDER — PHENAZOPYRIDINE HYDROCHLORIDE 100 MG/1
100 TABLET, FILM COATED ORAL
Status: DISCONTINUED | OUTPATIENT
Start: 2017-11-14 | End: 2017-11-14

## 2017-11-14 RX ORDER — NALOXONE HYDROCHLORIDE 0.4 MG/ML
0.1 INJECTION, SOLUTION INTRAMUSCULAR; INTRAVENOUS; SUBCUTANEOUS
Status: DISCONTINUED | OUTPATIENT
Start: 2017-11-14 | End: 2017-11-14 | Stop reason: HOSPADM

## 2017-11-14 RX ORDER — DEXAMETHASONE SODIUM PHOSPHATE 4 MG/ML
INJECTION, SOLUTION INTRA-ARTICULAR; INTRALESIONAL; INTRAMUSCULAR; INTRAVENOUS; SOFT TISSUE AS NEEDED
Status: DISCONTINUED | OUTPATIENT
Start: 2017-11-14 | End: 2017-11-14 | Stop reason: HOSPADM

## 2017-11-14 RX ORDER — MIDAZOLAM HYDROCHLORIDE 1 MG/ML
INJECTION, SOLUTION INTRAMUSCULAR; INTRAVENOUS AS NEEDED
Status: DISCONTINUED | OUTPATIENT
Start: 2017-11-14 | End: 2017-11-14 | Stop reason: HOSPADM

## 2017-11-14 RX ADMIN — FENTANYL CITRATE 25 MCG: 50 INJECTION, SOLUTION INTRAMUSCULAR; INTRAVENOUS at 14:46

## 2017-11-14 RX ADMIN — ONDANSETRON 4 MG: 2 INJECTION INTRAMUSCULAR; INTRAVENOUS at 14:17

## 2017-11-14 RX ADMIN — CEFOXITIN 2 G: 2 INJECTION, POWDER, FOR SOLUTION INTRAVENOUS at 14:10

## 2017-11-14 RX ADMIN — SODIUM CHLORIDE, SODIUM LACTATE, POTASSIUM CHLORIDE, AND CALCIUM CHLORIDE 125 ML/HR: 600; 310; 30; 20 INJECTION, SOLUTION INTRAVENOUS at 16:41

## 2017-11-14 RX ADMIN — KETOROLAC TROMETHAMINE 30 MG: 30 INJECTION, SOLUTION INTRAMUSCULAR; INTRAVENOUS at 15:18

## 2017-11-14 RX ADMIN — Medication 5 MG: at 14:42

## 2017-11-14 RX ADMIN — FENTANYL CITRATE 25 MCG: 50 INJECTION, SOLUTION INTRAMUSCULAR; INTRAVENOUS at 14:48

## 2017-11-14 RX ADMIN — FENTANYL CITRATE 25 MCG: 50 INJECTION, SOLUTION INTRAMUSCULAR; INTRAVENOUS at 14:11

## 2017-11-14 RX ADMIN — FENTANYL CITRATE 50 MCG: 50 INJECTION, SOLUTION INTRAMUSCULAR; INTRAVENOUS at 16:15

## 2017-11-14 RX ADMIN — FENTANYL CITRATE 25 MCG: 50 INJECTION, SOLUTION INTRAMUSCULAR; INTRAVENOUS at 14:33

## 2017-11-14 RX ADMIN — SODIUM CHLORIDE, SODIUM LACTATE, POTASSIUM CHLORIDE, AND CALCIUM CHLORIDE 125 ML/HR: 600; 310; 30; 20 INJECTION, SOLUTION INTRAVENOUS at 10:32

## 2017-11-14 RX ADMIN — DEXAMETHASONE SODIUM PHOSPHATE 4 MG: 4 INJECTION, SOLUTION INTRA-ARTICULAR; INTRALESIONAL; INTRAMUSCULAR; INTRAVENOUS; SOFT TISSUE at 14:23

## 2017-11-14 RX ADMIN — SODIUM CHLORIDE, SODIUM LACTATE, POTASSIUM CHLORIDE, AND CALCIUM CHLORIDE: 600; 310; 30; 20 INJECTION, SOLUTION INTRAVENOUS at 14:29

## 2017-11-14 RX ADMIN — PROPOFOL 100 MG: 10 INJECTION, EMULSION INTRAVENOUS at 14:05

## 2017-11-14 RX ADMIN — MIDAZOLAM HYDROCHLORIDE 2 MG: 1 INJECTION, SOLUTION INTRAMUSCULAR; INTRAVENOUS at 13:58

## 2017-11-14 RX ADMIN — FENTANYL CITRATE 50 MCG: 50 INJECTION, SOLUTION INTRAMUSCULAR; INTRAVENOUS at 16:22

## 2017-11-14 RX ADMIN — LIDOCAINE HYDROCHLORIDE 40 MG: 20 INJECTION, SOLUTION EPIDURAL; INFILTRATION; INTRACAUDAL; PERINEURAL at 14:05

## 2017-11-14 NOTE — ANESTHESIA POSTPROCEDURE EVALUATION
Post-Anesthesia Evaluation and Assessment    Cardiovascular Function/Vital Signs  Visit Vitals    /47    Pulse 81    Temp 37 °C (98.6 °F)    Resp 13    Ht 5' (1.524 m)    Wt 41.2 kg (90 lb 14.4 oz)    SpO2 100%    BMI 17.75 kg/m2       Patient is status post Procedure(s):  HODA SLEEVE PLACEMENT, CYSTOSCOPY  . Nausea/Vomiting: Controlled. Postoperative hydration reviewed and adequate. Pain:  Pain Scale 1: Numeric (0 - 10) (11/14/17 1700)  Pain Intensity 1: 0 (11/14/17 1700)   Managed. Neurological Status:   Neuro (WDL): Within Defined Limits (11/14/17 1637)   At baseline. Mental Status and Level of Consciousness: Arousable. Pulmonary Status:   O2 Device: Nasal cannula (11/14/17 1700)   Adequate oxygenation and airway patent. Complications related to anesthesia: None    Post-anesthesia assessment completed. No concerns. Patient has met all discharge requirements.     Signed By: Braden Velasquez MD    November 14, 2017

## 2017-11-14 NOTE — PERIOP NOTES
Able to communicate using a blue phone. # [id number]. Denies any pain at this time. Verified Phenazopyridine- cancel order per Doctor Aby .  See [facility]

## 2017-11-14 NOTE — BRIEF OP NOTE
BRIEF OPERATIVE NOTE    Date of Procedure: 11/14/2017   Preoperative Diagnosis: CERVICAL CANCER  Postoperative Diagnosis: CERVICAL CANCER    Procedure(s):  HODA SLEEVE PLACEMENT, CYSTOSCOPY    Surgeon(s) and Role:     * Aureliano Robertson MD - Primary         Assistant Staff:       Surgical Staff:  Circ-1: Jerry York RN  Circ-Relief: Chen Farooq RN  Scrub Tech-1: [facility]  Scrub Tech-Relief: Jacquelyn Gonzalez  Surg Asst-1: Harinder Croft  Event Time In   Incision Start 1435   Incision Close 1517     Anesthesia: General   Estimated Blood Loss: 20 cc  Specimens: * No specimens in log *   Findings: Cervix flush with vaginal apex with residual tumor and necrosis. Secured the [employer] to the right cervix and posterior vaginal apex where normal tissue was visible. Rectal exam normal with no tumor or suture palpated in the rectum. Normal cystoscopy with no evidence of tumor or suture in the bladder. Complications: none  Implants:   Implant Name Type Inv.  Item Serial No.  Lot No. LRB No. Used [facility]   [employer] HODA SLEEVE CT-MR         [id number] N/A 1 Implanted

## 2017-11-14 NOTE — IP AVS SNAPSHOT
[address] [facility] 
 
 
 [address] [facility] [address] Patient: Josias President MRN: [id number] OWY:[id number] About your hospitalization You were admitted on:  November 14, 2017 You last received care in the:  [facility] PHASE 2 RECOVERY You were discharged on:  November 14, 2017 Why you were hospitalized Your primary diagnosis was:  Not on File Things You Need To Do (next 8 weeks) Follow up with Shama Hilton MD  
  
Where:  Patient can only remember the practice name and not the physician Go to Regan Salas MD in 3 week(s) Phone:  [phone] Where:  [address] [facility], [facility] [address] Discharge Orders None A check brayan indicates which time of day the medication should be taken. My Medications TAKE these medications as instructed Instructions Each Dose to Equal  
 Morning Noon Evening Bedtime  
 ferrous sulfate 325 mg (65 mg iron) tablet Your last dose was: Your next dose is: Take  by mouth Daily (before breakfast). * HYDROmorphone 2 mg tablet Commonly known as:  DILAUDID Your last dose was: Your next dose is: Take 1-2 Tabs by mouth every four (4) hours as needed. Max Daily Amount: 24 mg. Indications: neoplasm related pain 2-4 mg  
    
   
   
   
  
 * HYDROmorphone 2 mg tablet Commonly known as:  DILAUDID Your last dose was: Your next dose is: Take 1 Tab by mouth every four (4) hours as needed for Pain. Max Daily Amount: 12 mg.  
 2 mg  
    
   
   
   
  
 ondansetron 4 mg disintegrating tablet Commonly known as:  ZOFRAN ODT Your last dose was: Your next dose is: Take 4 mg by mouth every eight (8) hours as needed for Nausea. Indications: CANCER CHEMOTHERAPY-INDUCED NAUSEA AND VOMITING  
 4 mg * Notice: This list has 2 medication(s) that are the same as other medications prescribed for you. Read the directions carefully, and ask your doctor or other care provider to review them with you. Where to Get Your Medications Information on where to get these meds will be given to you by the nurse or doctor. ! Ask your nurse or doctor about these medications HYDROmorphone 2 mg tablet Discharge Instructions Cystoscopy: What to Expect at [facility] Your Recovery A cystoscopy is a procedure that lets a doctor look inside of the bladder and the urethra. The urethra is the tube that carries urine from the bladder to outside the body. The doctor uses a thin, lighted tool called a cystoscope. Your bladder is filled with fluid. This stretches the bladder so that your doctor can look closely at the inside of your bladder. After the cystoscopy, your urethra may be sore at first, and it may burn when you urinate for the first few days after the procedure. You may feel the need to urinate more often, and your urine may be pink. These symptoms should get better in 1 or 2 days. You will probably be able to go back to most of your usual activities in 1 or 2 days. This care sheet gives you a general idea about how long it will take for you to recover. But each person recovers at a different pace. Follow the steps below to get better as quickly as possible. How can you care for yourself at home? Activity ? · Rest when you feel tired. Getting enough sleep will help you recover. ? · Try to walk each day. Start by walking a little more than you did the day before. Bit by bit, increase the amount you walk. Walking boosts blood flow and helps prevent pneumonia and constipation. ? · Avoid strenuous activities, such as bicycle riding, jogging, weight lifting, or aerobic exercise, until your doctor says it is okay. ? · Ask your doctor when you can drive again. ? · Most people are able to return to work within 1 or 2 days after the procedure. ? · You may shower and take baths as usual.  
? · Ask your doctor when it is okay for you to have sex. Diet ? · You can eat your normal diet. If your stomach is upset, try bland, low-fat foods like plain rice, broiled chicken, toast, and yogurt. ? · Drink plenty of fluids (unless your doctor tells you not to). Medicines ? · Take pain medicines exactly as directed. ¨ If the doctor gave you a prescription medicine for pain, take it as prescribed. ¨ If you are not taking a prescription pain medicine, ask your doctor if you can take an over-the-counter medicine. ? · If you think your pain medicine is making you sick to your stomach: 
¨ Take your medicine after meals (unless your doctor has told you not to). ¨ Ask your doctor for a different pain medicine. ? · If your doctor prescribed antibiotics, take them as directed. Do not stop taking them just because you feel better. You need to take the full course of antibiotics. Follow-up care is a key part of your treatment and safety. Be sure to make and go to all appointments, and call your doctor if you are having problems. It's also a good idea to know your test results and keep a list of the medicines you take. When should you call for help? Call 911 anytime you think you may need emergency care. For example, call if: 
? · You passed out (lost consciousness). ? · You have severe trouble breathing. ? · You have sudden chest pain and shortness of breath, or you cough up blood. ? · You have severe belly pain. ?Call your doctor now or seek immediate medical care if: 
? · You are sick to your stomach or cannot keep fluids down. ? · Your urine is still red or you see blood clots after you have urinated several times. ? · You have trouble passing urine or stool, especially if you have pain or swelling in your lower belly. ? · You have signs of a blood clot, such as: 
¨ Pain in your calf, back of the knee, thigh, or groin. ¨ Redness and swelling in your leg or groin. ? · You develop a fever or severe chills. ? · You have pain in your back just below your rib cage. This is called flank pain. ? Watch closely for changes in your health, and be sure to contact your doctor if: 
? · You have pain or burning when you urinate. A burning feeling is normal for a day or two after the test, but call if it does not get better. ? · You have a frequent urge to urinate but can pass only small amounts of urine. ? · Your urine is pink, red, or cloudy, or smells bad. It is normal for the urine to have a pinkish color for a few days after the test, but call if it does not get better. Where can you learn more? Go to http://hilary-thelma.info/. Enter G921 in the search box to learn more about \"Cystoscopy: What to Expect at Home. \" Current as of: May 12, 2017 Content Version: 11.4 © 4544-9815 [employer]. Care instructions adapted under license by [employer] (which disclaims liability or warranty for this information). If you have questions about a medical condition or this instruction, always ask your healthcare professional. [address] any warranty or liability for your use of this information. DISCHARGE SUMMARY from Nurse PATIENT INSTRUCTIONS: 
 
After general anesthesia or intravenous sedation, for 24 hours or while taking prescription Narcotics: · Limit your activities · Do not drive and operate hazardous machinery · Do not make important personal or business decisions · Do  not drink alcoholic beverages · If you have not urinated within 8 hours after discharge, please contact your surgeon on call. Report the following to your surgeon: 
· Excessive pain, swelling, redness or odor of or around the surgical area · Temperature over 100.5 · Nausea and vomiting lasting longer than 4 hours or if unable to take medications · Any signs of decreased circulation or nerve impairment to extremity: change in color, persistent  numbness, tingling, coldness or increase pain · Any questions What to do at Home: 
Recommended activity: Activity as tolerated and Ambulate in house, No heavy lifting or strenuous activity until follow up appointment. If you experience any of the following symptoms fever greater than 101.0, chills, nausea or vomiting, vaginal bleeding, pain not relieved with medication, please follow up with Dr. Daja Barroso. Increase fluid intake at home. Regular diet as tolerated. Pelvic rest until follow up appointment; no tampons, douching, or intercourse. No heavy lifting or strenuous activity until follow up appointment. *  Please give a list of your current medications to your Primary Care Provider. *  Please update this list whenever your medications are discontinued, doses are 
    changed, or new medications (including over-the-counter products) are added. *  Please carry medication information at all times in case of emergency situations. These are general instructions for a healthy lifestyle: No smoking/ No tobacco products/ Avoid exposure to second hand smoke Surgeon General's Warning:  Quitting smoking now greatly reduces serious risk to your health. Obesity, smoking, and sedentary lifestyle greatly increases your risk for illness A healthy diet, regular physical exercise & weight monitoring are important for maintaining a healthy lifestyle You may be retaining fluid if you have a history of heart failure or if you experience any of the following symptoms:  Weight gain of 3 pounds or more overnight or 5 pounds in a week, increased swelling in our hands or feet or shortness of breath while lying flat in bed.   Please call your doctor as soon as you notice any of these symptoms; do not wait until your next office visit. Recognize signs and symptoms of STROKE: 
 
F-face looks uneven A-arms unable to move or move unevenly S-speech slurred or non-existent T-time-call 911 as soon as signs and symptoms begin-DO NOT go Back to bed or wait to see if you get better-TIME IS BRAIN. Warning Signs of HEART ATTACK Call 911 if you have these symptoms: 
? Chest discomfort. Most heart attacks involve discomfort in the center of the chest that lasts more than a few minutes, or that goes away and comes back. It can feel like uncomfortable pressure, squeezing, fullness, or pain. ? Discomfort in other areas of the upper body. Symptoms can include pain or discomfort in one or both arms, the back, neck, jaw, or stomach. ? Shortness of breath with or without chest discomfort. ? Other signs may include breaking out in a cold sweat, nausea, or lightheadedness. Don't wait more than five minutes to call [address]! Fast action can save your life. Calling 911 is almost always the fastest way to get lifesaving treatment. Emergency Medical Services staff can begin treatment when they arrive  up to an hour sooner than if someone gets to the hospital by car. The discharge information has been reviewed with the patient and caregiver. The patient and caregiver verbalized understanding. Discharge medications reviewed with the patient and caregiver and appropriate educational materials and side effects teaching were provided. Patient armband removed and shredded. ___________________________________________________________________________________________________________________________________ Introducing [facility] SERVICES! Shorty Simon introduces [employer] patient portal. Now you can access parts of your medical record, email your doctor's office, and request medication refills online. 1. In your internet browser, go to [email or web address] 2. Click on the First Time User? Click Here link in the Sign In box. You will see the New Member Sign Up page. 3. Enter your [employer] Access Code exactly as it appears below. You will not need to use this code after youve completed the sign-up process. If you do not sign up before the expiration date, you must request a new code. · [employer] Access Code: [email or web address] Expires: 12/25/2017  4:34 PM 
 
4. Enter the last four digits of your Social Security Number (xxxx) and Date of Birth (mm/dd/yyyy) as indicated and click Submit. You will be taken to the next sign-up page. 5. Create a [employer] ID. This will be your [employer] login ID and cannot be changed, so think of one that is secure and easy to remember. 6. Create a [employer] password. You can change your password at any time. 7. Enter your Password Reset Question and Answer. This can be used at a later time if you forget your password. 8. Enter your e-mail address. You will receive e-mail notification when new information is available in [address] 9. Click Sign Up. You can now view and download portions of your medical record. 10. Click the Download Summary menu link to download a portable copy of your medical information. If you have questions, please visit the Frequently Asked Questions section of the [employer] website. Remember, [employer] is NOT to be used for urgent needs. For medical emergencies, dial 911. Now available from your iPhone and Android! Providers Seen During Your Hospitalization Provider Specialty Primary office phone Ye Conn MD Gynecologic Oncology [phone] Your Primary Care Physician (PCP) Primary Care Physician Office Phone Office Fax OTHER, PHYS ** None ** ** None ** You are allergic to the following No active allergies Recent Documentation Height Weight BMI OB Status Smoking Status 1.524 m 41.2 kg 17.75 kg/m2 Having regular periods Never Smoker Emergency Contacts Name Discharge Info Relation Home Work Mobile [address] DISCHARGE CAREGIVER [3] Spouse [3]   [phone] Patient Belongings The following personal items are in your possession at time of discharge: 
  Dental Appliances: None  Visual Aid: Glasses (readin eyegalssesd)      Home Medications: None   Jewelry: None  Clothing: Pants, Shirt, Footwear, Undergarments (with spouse)    Other Valuables: Purse (with spouse) Please provide this summary of care documentation to your next provider. Signatures-by signing, you are acknowledging that this After Visit Summary has been reviewed with you and you have received a copy. Patient Signature:  ____________________________________________________________ Date:  ____________________________________________________________  
  
[facility] Provider Signature:  ____________________________________________________________ Date:  ____________________________________________________________

## 2017-11-14 NOTE — ANESTHESIA PREPROCEDURE EVALUATION
Anesthetic History   No history of anesthetic complications            Review of Systems / Medical History  Patient summary reviewed, nursing notes reviewed and pertinent labs reviewed    Pulmonary  Within defined limits                 Neuro/Psych   Within defined limits           Cardiovascular  Within defined limits                Exercise tolerance: >4 METS     GI/Hepatic/Renal  Within defined limits              Endo/Other        Cancer     Other Findings            Physical Exam    Airway  Mallampati: II  TM Distance: 4 - 6 cm    Mouth opening: Normal     Cardiovascular               Dental         Pulmonary  Breath sounds clear to auscultation               Abdominal  GI exam deferred       Other Findings            Anesthetic Plan    ASA: 2  Anesthesia type: general          Induction: Intravenous  Anesthetic plan and risks discussed with: Patient

## 2017-11-14 NOTE — PERIOP NOTES
TRANSFER - IN REPORT:    Verbal report received from ORN  & CRNA on Josias Fry  being received from OR(unit) for routine post - op      Report consisted of patients Situation, Background, Assessment and   Recommendations(SBAR). Information from the following report(s) SBAR, Intake/Output and MAR was reviewed with the receiving nurse. Opportunity for questions and clarification was provided. Assessment completed upon patients arrival to unit and care assumed.

## 2017-11-14 NOTE — DISCHARGE INSTRUCTIONS
Cystoscopy: What to Expect at [address] [facility]  A cystoscopy is a procedure that lets a doctor look inside of the bladder and the urethra. The urethra is the tube that carries urine from the bladder to outside the body. The doctor uses a thin, lighted tool called a cystoscope. Your bladder is filled with fluid. This stretches the bladder so that your doctor can look closely at the inside of your bladder. After the cystoscopy, your urethra may be sore at first, and it may burn when you urinate for the first few days after the procedure. You may feel the need to urinate more often, and your urine may be pink. These symptoms should get better in 1 or 2 days. You will probably be able to go back to most of your usual activities in 1 or 2 days. This care sheet gives you a general idea about how long it will take for you to recover. But each person recovers at a different pace. Follow the steps below to get better as quickly as possible. How can you care for yourself at home? Activity  ? · Rest when you feel tired. Getting enough sleep will help you recover. ? · Try to walk each day. Start by walking a little more than you did the day before. Bit by bit, increase the amount you walk. Walking boosts blood flow and helps prevent pneumonia and constipation. ? · Avoid strenuous activities, such as bicycle riding, jogging, weight lifting, or aerobic exercise, until your doctor says it is okay. ? · Ask your doctor when you can drive again. ? · Most people are able to return to work within 1 or 2 days after the procedure. ? · You may shower and take baths as usual.   ? · Ask your doctor when it is okay for you to have sex. Diet  ? · You can eat your normal diet. If your stomach is upset, try bland, low-fat foods like plain rice, broiled chicken, toast, and yogurt. ? · Drink plenty of fluids (unless your doctor tells you not to). Medicines  ? · Take pain medicines exactly as directed.   ¨ If the doctor gave you a prescription medicine for pain, take it as prescribed. ¨ If you are not taking a prescription pain medicine, ask your doctor if you can take an over-the-counter medicine. ? · If you think your pain medicine is making you sick to your stomach:  ¨ Take your medicine after meals (unless your doctor has told you not to). ¨ Ask your doctor for a different pain medicine. ? · If your doctor prescribed antibiotics, take them as directed. Do not stop taking them just because you feel better. You need to take the full course of antibiotics. Follow-up care is a key part of your treatment and safety. Be sure to make and go to all appointments, and call your doctor if you are having problems. It's also a good idea to know your test results and keep a list of the medicines you take. When should you call for help? Call 911 anytime you think you may need emergency care. For example, call if:  ? · You passed out (lost consciousness). ? · You have severe trouble breathing. ? · You have sudden chest pain and shortness of breath, or you cough up blood. ? · You have severe belly pain. ?Call your doctor now or seek immediate medical care if:  ? · You are sick to your stomach or cannot keep fluids down. ? · Your urine is still red or you see blood clots after you have urinated several times. ? · You have trouble passing urine or stool, especially if you have pain or swelling in your lower belly. ? · You have signs of a blood clot, such as:  ¨ Pain in your calf, back of the knee, thigh, or groin. ¨ Redness and swelling in your leg or groin. ? · You develop a fever or severe chills. ? · You have pain in your back just below your rib cage. This is called flank pain. ? Watch closely for changes in your health, and be sure to contact your doctor if:  ? · You have pain or burning when you urinate. A burning feeling is normal for a day or two after the test, but call if it does not get better.    ? · You have a frequent urge to urinate but can pass only small amounts of urine. ? · Your urine is pink, red, or cloudy, or smells bad. It is normal for the urine to have a pinkish color for a few days after the test, but call if it does not get better. Where can you learn more? Go to http://hilary-thelma.info/. Enter C096 in the search box to learn more about \"Cystoscopy: What to Expect at Home. \"  Current as of: May 12, 2017  Content Version: 11.4  © 0565-2012 [employer]. Care instructions adapted under license by [employer] (which disclaims liability or warranty for this information). If you have questions about a medical condition or this instruction, always ask your healthcare professional. [place] [address] any warranty or liability for your use of this information. DISCHARGE SUMMARY from Nurse    PATIENT INSTRUCTIONS:    After general anesthesia or intravenous sedation, for 24 hours or while taking prescription Narcotics:  · Limit your activities  · Do not drive and operate hazardous machinery  · Do not make important personal or business decisions  · Do  not drink alcoholic beverages  · If you have not urinated within 8 hours after discharge, please contact your surgeon on call. Report the following to your surgeon:  · Excessive pain, swelling, redness or odor of or around the surgical area  · Temperature over 100.5  · Nausea and vomiting lasting longer than 4 hours or if unable to take medications  · Any signs of decreased circulation or nerve impairment to extremity: change in color, persistent  numbness, tingling, coldness or increase pain  · Any questions    What to do at Home:  Recommended activity: Activity as tolerated and Ambulate in house, No heavy lifting or strenuous activity until follow up appointment.     If you experience any of the following symptoms fever greater than 101.0, chills, nausea or vomiting, vaginal bleeding, pain not relieved with medication, please follow up with Dr. Alice Reynolds. Increase fluid intake at home. Regular diet as tolerated. Pelvic rest until follow up appointment; no tampons, douching, or intercourse. No heavy lifting or strenuous activity until follow up appointment. *  Please give a list of your current medications to your Primary Care Provider. *  Please update this list whenever your medications are discontinued, doses are      changed, or new medications (including over-the-counter products) are added. *  Please carry medication information at all times in case of emergency situations. These are general instructions for a healthy lifestyle:    No smoking/ No tobacco products/ Avoid exposure to second hand smoke  Surgeon General's Warning:  Quitting smoking now greatly reduces serious risk to your health. Obesity, smoking, and sedentary lifestyle greatly increases your risk for illness    A healthy diet, regular physical exercise & weight monitoring are important for maintaining a healthy lifestyle    You may be retaining fluid if you have a history of heart failure or if you experience any of the following symptoms:  Weight gain of 3 pounds or more overnight or 5 pounds in a week, increased swelling in our hands or feet or shortness of breath while lying flat in bed. Please call your doctor as soon as you notice any of these symptoms; do not wait until your next office visit. Recognize signs and symptoms of STROKE:    F-face looks uneven    A-arms unable to move or move unevenly    S-speech slurred or non-existent    T-time-call 911 as soon as signs and symptoms begin-DO NOT go       Back to bed or wait to see if you get better-TIME IS BRAIN. Warning Signs of HEART ATTACK     Call 911 if you have these symptoms:   Chest discomfort. Most heart attacks involve discomfort in the center of the chest that lasts more than a few minutes, or that goes away and comes back.  It can feel like uncomfortable pressure, squeezing, fullness, or pain.  Discomfort in other areas of the upper body. Symptoms can include pain or discomfort in one or both arms, the back, neck, jaw, or stomach.  Shortness of breath with or without chest discomfort.  Other signs may include breaking out in a cold sweat, nausea, or lightheadedness. Don't wait more than five minutes to call 911 - MINUTES MATTER! Fast action can save your life. Calling 911 is almost always the fastest way to get lifesaving treatment. Emergency Medical Services staff can begin treatment when they arrive -- up to an hour sooner than if someone gets to the hospital by car. The discharge information has been reviewed with the patient and caregiver. The patient and caregiver verbalized understanding. Discharge medications reviewed with the patient and caregiver and appropriate educational materials and side effects teaching were provided. Patient armband removed and shredded.     ___________________________________________________________________________________________________________________________________

## 2017-11-14 NOTE — H&P
Date of Surgery Update:  Austin Dodd was seen and examined. History and physical has been reviewed. The patient has been examined. There have been no significant clinical changes since the completion of the originally dated History and Physical.  Patient identified by surgeon; surgical site was confirmed by patient and surgeon.     Signed By: Molly Sandoval MD     November 14, 2017 1:43 PM

## 2017-11-20 NOTE — OP NOTES
[address] [facility] [address]  [address] [facility] [place] [address]  OPERATIVE REPORT    PATIENT:       Diaz Corbett  MRN:              [id number]  DATE:             11/20/17  DICTATING:     Gianni Goss MD      PREOPERATIVE DIAGNOSES:  1. 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 2. Receiving primary chemoradiation therapy. 3. Needs Matt Sleeve placement for brachytherapy. 4. Urinary urgency   5. Hematuria      POSTOPERATIVE DIAGNOSES:  1. 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 2. Receiving primary chemoradiation therapy. 3. Needs Matt Sleeve placement for brachytherapy. 4. Urinary urgency  5. Hematuria    PROCEDURES PERFORMED: Matt Sleeve placement. SURGEON: Gianni Goss MD    ASSISTANT: Damaso Wheatley    ANESTHESIA: General.    ANESTHESIA PROVIDERS: Anesthesiologist: Anatoliy Serrato MD  CRNA: Filemon Hanley CRNA    ESTIMATED BLOOD LOSS: Less than 25 mL. SPECIMENS REMOVED: None. FINDINGS: Cervix flush with vaginal apex with residual tumor and necrosis. Secured the [employer] to the right cervix and posterior vaginal apex where normal tissue was visible. Rectal exam normal with no tumor or suture palpated in the rectum. Normal cystoscopy except cystitis with no evidence of tumor or suture in the bladder. COMPLICATIONS: None. IMPLANTS: Matt Sleeve in the uterine cervix. INDICATIONS: The patient is a 49 y.o. female with Stage IIIB Squamous cell carcinoma of the cervix diagnosed  9/26/2017. The patient is receiving definitive chemoradiation therapy. She presents today for a Matt Sleeve placement for brachytherapy. DESCRIPTION OF PROCEDURE: After the patient was accurately identified, consent was verified, signed, on the chart, and all questions were answered, she patient was taken to the operating room with IV running. She was placed  in the supine position.  Sequential compression devices were placed on the bilateral lower extremities and were functioning prior to induction of anesthesia. Mefoxin 2g was given intravenously as prophylactic antibiotics within 1 hour prior to start of the procedure. The patient underwent dosing of general of general anesthesia. She was placed in the  low dorsal lithotomy position in Madelin Aase stirrups and prepped and draped in the normal fashion using Betadine. A preprocedure time-out was performed by the OR staff, including anesthesiologist and surgeon. The bladder was  drained with an in and out catheter. A weighted speculum was placed in the vagina. A Mckinley retractor was placed anteriorly. The cervix was visualized and described above. A uterine sound was placed through the cervix. The cervix was dilated to accommodate the Matt sleeve. A 0 Prolene suture was placed in two quadrants of the cervix to secure the Matt Sleeve in the endocervical canal flush against the portio of the cervix. Once proper position of the Matt sleeve was verified, all instruments were removed from the vagina. A cystoscopy was then performed using the 70 degree cystoscope and the 17 Czech sheath which was easily passed through the urethra. The urethral mucosa and bladder mucosa were visualized. There was no tumor, sutures or trauma to the bladder. Active jets of urine was seen from both ureteral orifices. The bladder was drained and the cystoscope was removed. The patient was replaced in the supine position, awakened from anesthesia, extubated, transferred to the hospital bed and transported to the PACU awake and in stable condition. All sharp, instrument, and sponge counts were  correct. There were no complications with this procedure.       Nickie Freeman MD

## 2018-03-27 ENCOUNTER — HOSPITAL ENCOUNTER (OUTPATIENT)
Dept: PET IMAGING | Age: 48
Discharge: HOME | End: 2018-03-27
Attending: OBSTETRICS & GYNECOLOGY
Payer: SUBSIDIZED

## 2018-03-27 DIAGNOSIS — N93.9 VAGINAL BLEEDING: ICD-10-CM

## 2018-03-27 DIAGNOSIS — D62 ACUTE BLOOD LOSS ANEMIA: ICD-10-CM

## 2018-03-27 DIAGNOSIS — C53.1 MALIGNANT NEOPLASM OF EXOCERVIX (HCC): ICD-10-CM

## 2018-03-27 DIAGNOSIS — R19.00 PELVIC MASS: ICD-10-CM

## 2018-03-27 PROCEDURE — A9552 F18 FDG: HCPCS

## 2018-07-10 ENCOUNTER — HOSPITAL ENCOUNTER (OUTPATIENT)
Dept: PET IMAGING | Age: 48
Discharge: HOME | End: 2018-07-10
Attending: OBSTETRICS & GYNECOLOGY
Payer: COMMERCIAL

## 2018-07-10 DIAGNOSIS — C53.9 MALIGNANT NEOPLASM OF CERVIX UTERI (HCC): ICD-10-CM

## 2018-07-10 PROCEDURE — 78815 PET IMAGE W/CT SKULL-THIGH: CPT

## 2018-07-23 ENCOUNTER — HOSPITAL ENCOUNTER (OUTPATIENT)
Dept: PREADMISSION TESTING | Age: 48
Discharge: HOME | End: 2018-07-23
Payer: COMMERCIAL

## 2018-07-23 VITALS — HEIGHT: 60 IN | BODY MASS INDEX: 19.04 KG/M2 | WEIGHT: 97 LBS

## 2018-07-23 LAB
HCT VFR BLD AUTO: 31.9 % (ref 35–45)
HGB BLD-MCNC: 9.9 G/DL (ref 12–16)

## 2018-07-23 PROCEDURE — 85018 HEMOGLOBIN: CPT | Performed by: OBSTETRICS & GYNECOLOGY

## 2018-07-23 RX ORDER — SODIUM CHLORIDE, SODIUM LACTATE, POTASSIUM CHLORIDE, CALCIUM CHLORIDE 600; 310; 30; 20 MG/100ML; MG/100ML; MG/100ML; MG/100ML
125 INJECTION, SOLUTION INTRAVENOUS CONTINUOUS
Status: CANCELLED | OUTPATIENT
Start: 2018-07-23

## 2018-07-23 RX ORDER — CEFAZOLIN SODIUM/WATER 2 G/20 ML
2 SYRINGE (ML) INTRAVENOUS ONCE
Status: CANCELLED | OUTPATIENT
Start: 2018-07-23 | End: 2018-07-23

## 2018-07-23 NOTE — PERIOP NOTES
No sleep apnea or removable prosthetic devices. Care Fusion kit given to patient with instructions. Reviewed Catracho wipes. No family history of MH. Pt speaks only [place], spouse interpreted for this PAT interview. Pt meets criteria for special populations.

## 2018-07-26 ENCOUNTER — ANESTHESIA (OUTPATIENT)
Dept: SURGERY | Age: 48
End: 2018-07-26
Payer: COMMERCIAL

## 2018-07-26 ENCOUNTER — HOSPITAL ENCOUNTER (OUTPATIENT)
Age: 48
Setting detail: OUTPATIENT SURGERY
Discharge: HOME | End: 2018-07-26
Attending: OBSTETRICS & GYNECOLOGY | Admitting: OBSTETRICS & GYNECOLOGY
Payer: COMMERCIAL

## 2018-07-26 ENCOUNTER — ANESTHESIA EVENT (OUTPATIENT)
Dept: SURGERY | Age: 48
End: 2018-07-26
Payer: COMMERCIAL

## 2018-07-26 VITALS
WEIGHT: 95.56 LBS | OXYGEN SATURATION: 100 % | SYSTOLIC BLOOD PRESSURE: 100 MMHG | TEMPERATURE: 98.3 F | DIASTOLIC BLOOD PRESSURE: 58 MMHG | RESPIRATION RATE: 12 BRPM | HEART RATE: 65 BPM | BODY MASS INDEX: 18.76 KG/M2 | HEIGHT: 60 IN

## 2018-07-26 PROCEDURE — 77030002933 HC SUT MCRYL J&J -A: Performed by: OBSTETRICS & GYNECOLOGY

## 2018-07-26 PROCEDURE — 77030020782 HC GWN BAIR PAWS FLX 3M -B: Performed by: OBSTETRICS & GYNECOLOGY

## 2018-07-26 PROCEDURE — 77030031139 HC SUT VCRL2 J&J -A: Performed by: OBSTETRICS & GYNECOLOGY

## 2018-07-26 PROCEDURE — 76210000021 HC REC RM PH II 0.5 TO 1 HR: Performed by: OBSTETRICS & GYNECOLOGY

## 2018-07-26 PROCEDURE — 74011250636 HC RX REV CODE- 250/636

## 2018-07-26 PROCEDURE — 77030039266 HC ADH SKN EXOFIN S2SG -A: Performed by: OBSTETRICS & GYNECOLOGY

## 2018-07-26 PROCEDURE — 74011250636 HC RX REV CODE- 250/636: Performed by: OBSTETRICS & GYNECOLOGY

## 2018-07-26 PROCEDURE — 76010000138 HC OR TIME 0.5 TO 1 HR: Performed by: OBSTETRICS & GYNECOLOGY

## 2018-07-26 PROCEDURE — 76060000032 HC ANESTHESIA 0.5 TO 1 HR: Performed by: OBSTETRICS & GYNECOLOGY

## 2018-07-26 PROCEDURE — 74011000250 HC RX REV CODE- 250: Performed by: OBSTETRICS & GYNECOLOGY

## 2018-07-26 RX ORDER — OXYCODONE HYDROCHLORIDE 5 MG/1
5 TABLET ORAL ONCE
Status: CANCELLED | OUTPATIENT
Start: 2018-07-26 | End: 2018-07-26

## 2018-07-26 RX ORDER — DIPHENHYDRAMINE HYDROCHLORIDE 50 MG/ML
12.5 INJECTION, SOLUTION INTRAMUSCULAR; INTRAVENOUS
Status: CANCELLED | OUTPATIENT
Start: 2018-07-26

## 2018-07-26 RX ORDER — SODIUM CHLORIDE, SODIUM LACTATE, POTASSIUM CHLORIDE, CALCIUM CHLORIDE 600; 310; 30; 20 MG/100ML; MG/100ML; MG/100ML; MG/100ML
150 INJECTION, SOLUTION INTRAVENOUS CONTINUOUS
Status: CANCELLED | OUTPATIENT
Start: 2018-07-26

## 2018-07-26 RX ORDER — BUPIVACAINE HYDROCHLORIDE AND EPINEPHRINE 5; 5 MG/ML; UG/ML
INJECTION, SOLUTION EPIDURAL; INTRACAUDAL; PERINEURAL AS NEEDED
Status: DISCONTINUED | OUTPATIENT
Start: 2018-07-26 | End: 2018-07-26 | Stop reason: HOSPADM

## 2018-07-26 RX ORDER — ONDANSETRON 2 MG/ML
INJECTION INTRAMUSCULAR; INTRAVENOUS AS NEEDED
Status: DISCONTINUED | OUTPATIENT
Start: 2018-07-26 | End: 2018-07-26 | Stop reason: HOSPADM

## 2018-07-26 RX ORDER — PROPOFOL 10 MG/ML
INJECTION, EMULSION INTRAVENOUS AS NEEDED
Status: DISCONTINUED | OUTPATIENT
Start: 2018-07-26 | End: 2018-07-26 | Stop reason: HOSPADM

## 2018-07-26 RX ORDER — FENTANYL CITRATE 50 UG/ML
25 INJECTION, SOLUTION INTRAMUSCULAR; INTRAVENOUS AS NEEDED
Status: CANCELLED | OUTPATIENT
Start: 2018-07-26

## 2018-07-26 RX ORDER — MAGNESIUM SULFATE 100 %
4 CRYSTALS MISCELLANEOUS AS NEEDED
Status: CANCELLED | OUTPATIENT
Start: 2018-07-26

## 2018-07-26 RX ORDER — MIDAZOLAM HYDROCHLORIDE 1 MG/ML
INJECTION, SOLUTION INTRAMUSCULAR; INTRAVENOUS AS NEEDED
Status: DISCONTINUED | OUTPATIENT
Start: 2018-07-26 | End: 2018-07-26 | Stop reason: HOSPADM

## 2018-07-26 RX ORDER — DEXAMETHASONE SODIUM PHOSPHATE 4 MG/ML
INJECTION, SOLUTION INTRA-ARTICULAR; INTRALESIONAL; INTRAMUSCULAR; INTRAVENOUS; SOFT TISSUE AS NEEDED
Status: DISCONTINUED | OUTPATIENT
Start: 2018-07-26 | End: 2018-07-26 | Stop reason: HOSPADM

## 2018-07-26 RX ORDER — SODIUM CHLORIDE, SODIUM LACTATE, POTASSIUM CHLORIDE, CALCIUM CHLORIDE 600; 310; 30; 20 MG/100ML; MG/100ML; MG/100ML; MG/100ML
125 INJECTION, SOLUTION INTRAVENOUS CONTINUOUS
Status: DISCONTINUED | OUTPATIENT
Start: 2018-07-26 | End: 2018-07-26 | Stop reason: HOSPADM

## 2018-07-26 RX ORDER — CEFAZOLIN SODIUM/WATER 2 G/20 ML
2 SYRINGE (ML) INTRAVENOUS ONCE
Status: COMPLETED | OUTPATIENT
Start: 2018-07-26 | End: 2018-07-26

## 2018-07-26 RX ORDER — FENTANYL CITRATE 50 UG/ML
INJECTION, SOLUTION INTRAMUSCULAR; INTRAVENOUS AS NEEDED
Status: DISCONTINUED | OUTPATIENT
Start: 2018-07-26 | End: 2018-07-26 | Stop reason: HOSPADM

## 2018-07-26 RX ORDER — DEXTROSE 50 % IN WATER (D50W) INTRAVENOUS SYRINGE
25-50 AS NEEDED
Status: CANCELLED | OUTPATIENT
Start: 2018-07-26

## 2018-07-26 RX ORDER — ONDANSETRON 2 MG/ML
4 INJECTION INTRAMUSCULAR; INTRAVENOUS ONCE
Status: CANCELLED | OUTPATIENT
Start: 2018-07-26 | End: 2018-07-26

## 2018-07-26 RX ORDER — NALOXONE HYDROCHLORIDE 0.4 MG/ML
0.1 INJECTION, SOLUTION INTRAMUSCULAR; INTRAVENOUS; SUBCUTANEOUS AS NEEDED
Status: CANCELLED | OUTPATIENT
Start: 2018-07-26

## 2018-07-26 RX ORDER — SODIUM CHLORIDE 0.9 % (FLUSH) 0.9 %
5-10 SYRINGE (ML) INJECTION AS NEEDED
Status: CANCELLED | OUTPATIENT
Start: 2018-07-26

## 2018-07-26 RX ORDER — ALBUTEROL SULFATE 0.83 MG/ML
2.5 SOLUTION RESPIRATORY (INHALATION) AS NEEDED
Status: CANCELLED | OUTPATIENT
Start: 2018-07-26

## 2018-07-26 RX ADMIN — DEXAMETHASONE SODIUM PHOSPHATE 4 MG: 4 INJECTION, SOLUTION INTRA-ARTICULAR; INTRALESIONAL; INTRAMUSCULAR; INTRAVENOUS; SOFT TISSUE at 11:59

## 2018-07-26 RX ADMIN — FENTANYL CITRATE 25 MCG: 50 INJECTION, SOLUTION INTRAMUSCULAR; INTRAVENOUS at 11:54

## 2018-07-26 RX ADMIN — PROPOFOL 25 MG: 10 INJECTION, EMULSION INTRAVENOUS at 11:45

## 2018-07-26 RX ADMIN — Medication 2 G: at 11:39

## 2018-07-26 RX ADMIN — FENTANYL CITRATE 25 MCG: 50 INJECTION, SOLUTION INTRAMUSCULAR; INTRAVENOUS at 11:57

## 2018-07-26 RX ADMIN — FENTANYL CITRATE 50 MCG: 50 INJECTION, SOLUTION INTRAMUSCULAR; INTRAVENOUS at 11:48

## 2018-07-26 RX ADMIN — SODIUM CHLORIDE, SODIUM LACTATE, POTASSIUM CHLORIDE, AND CALCIUM CHLORIDE 125 ML/HR: 600; 310; 30; 20 INJECTION, SOLUTION INTRAVENOUS at 10:24

## 2018-07-26 RX ADMIN — PROPOFOL 25 MG: 10 INJECTION, EMULSION INTRAVENOUS at 11:58

## 2018-07-26 RX ADMIN — ONDANSETRON 4 MG: 2 INJECTION INTRAMUSCULAR; INTRAVENOUS at 11:45

## 2018-07-26 RX ADMIN — MIDAZOLAM HYDROCHLORIDE 2 MG: 1 INJECTION, SOLUTION INTRAMUSCULAR; INTRAVENOUS at 11:39

## 2018-07-26 NOTE — PERIOP NOTES
Franco Batres RN spoke with  and confirmed that the patient has not had a hysterectomy.    does not want the patient to have an HCG test.

## 2018-07-26 NOTE — PERIOP NOTES
Pt was transported to vehicle via wheelchair and placed in vehicle upon getting in on passenger side and proceeded into the  seat position and vehicle was seen pulling away

## 2018-07-26 NOTE — INTERVAL H&P NOTE
H&P Update:  Marline Salazar was seen and examined. History and physical has been reviewed. The patient has been examined.  There have been no significant clinical changes since the completion of the originally dated History and Physical.    Signed By: Peterson Fontenot MD     July 26, 2018 11:17 AM

## 2018-07-26 NOTE — IP AVS SNAPSHOT
Summary of Care Report The Summary of Care report has been created to help improve care coordination. Users with access to [employer] or [address] (Web-based application) may access additional patient information including the Discharge Summary. If you are not currently a [address] user and need more information, please call the number listed below in the [place] [address] section and ask to be connected with Medical Records. Facility Information Name Address Phone [address] [address] [phone] Patient Information Patient Name Sex  Domo Monae ([id number]) Female 1970 Discharge Information Admitting Provider Service Area Unit Faye Esposito MD / [facility] Surgery / [phone] Discharge Provider Discharge Date/Time Discharge Disposition Destination (none) 2018 (Pending) AHR (none) Patient Language Language Hebrew [46] Hospital Problems as of 2018  Reviewed: 2018 11:09 AM by Vera Paula MD  
 None Non-Hospital Problems as of 2018  Reviewed: 2018 11:09 AM by Vera Paula MD  
  
  
  
 Class Noted - Resolved Last Modified Active Problems Vaginal bleeding  2017 - Present 2017 by Faye Esposito MD  
  Entered by Carlos Espinosa MD  
  Cervical mass  2017 - Present 2017 by Faye Esposito MD  
  Entered by Faye Esposito MD  
  Excessive vaginal bleeding  2017 - Present 2017 by Faye Esposito MD  
  Entered by Shiloh Arguelles MD  
  Malignant neoplasm of exocervix ([facility] [address]  2017 - Present 2017 by Faye Esposito MD  
  Entered by Faye Esposito MD  
  
You are allergic to the following No active allergies Current Discharge Medication List  
  
Notice You have not been prescribed any medications. Surgery Information ID Date/Time Status Primary Surgeon All Procedures Location [id number] 7/26/2018 Andrew Ramey MD IV MEDIPORT REMOVAL, \"SPEC POP\" [facility] MAIN OR Follow-up Information Follow up With Details Comments Contact Info Provider Unknown   Patient not available to ask Discharge Instructions DISCHARGE SUMMARY from Nurse PATIENT INSTRUCTIONS: 
 
 
F-face looks uneven A-arms unable to move or move unevenly S-speech slurred or non-existent T-time-call 911 as soon as signs and symptoms begin-DO NOT go Back to bed or wait to see if you get better-TIME IS BRAIN. Warning Signs of HEART ATTACK Call 911 if you have these symptoms: 
? Chest discomfort. Most heart attacks involve discomfort in the center of the chest that lasts more than a few minutes, or that goes away and comes back. It can feel like uncomfortable pressure, squeezing, fullness, or pain. ? Discomfort in other areas of the upper body. Symptoms can include pain or discomfort in one or both arms, the back, neck, jaw, or stomach. ? Shortness of breath with or without chest discomfort. ? Other signs may include breaking out in a cold sweat, nausea, or lightheadedness. Don't wait more than five minutes to call [address]! Fast action can save your life. Calling 911 is almost always the fastest way to get lifesaving treatment. Emergency Medical Services staff can begin treatment when they arrive  up to an hour sooner than if someone gets to the hospital by car.   
 
The discharge information has been reviewed with the patient and caregiver. The patient and caregiver verbalized understanding. Discharge medications reviewed with the patient and caregiver and appropriate educational materials and side effects teaching were provided. Patient armband removed and shredded 
___________________________________________________________________________________________________________________________________ Chart Review Routing History No Routing History on File

## 2018-07-26 NOTE — IP AVS SNAPSHOT
[address] [place] [address] 
[phone] Patient: Lainey Loco MRN: [id number] ZKR:[id number] About your hospitalization You were admitted on:  July 26, 2018 You last received care in the:  [address] [place] [address] You were discharged on:  July 26, 2018 Why you were hospitalized Your primary diagnosis was:  Not on File Follow-up Information Follow up With Details Comments Contact Info Provider Unknown   Patient not available to ask Discharge Orders None A check brayan indicates which time of day the medication should be taken. My Medications Notice You have not been prescribed any medications. Discharge Instructions DISCHARGE SUMMARY from Nurse PATIENT INSTRUCTIONS: 
 
After general anesthesia or intravenous sedation, for 24 hours or while taking prescription Narcotics: · Limit your activities · Do not drive and operate hazardous machinery · Do not make important personal or business decisions · Do  not drink alcoholic beverages · If you have not urinated within 8 hours after discharge, please contact your surgeon on call. Report the following to your surgeon: 
· Excessive pain, swelling, redness or odor of or around the surgical area · Temperature over 100.5 · Nausea and vomiting lasting longer than 4 hours or if unable to take medications · Any signs of decreased circulation or nerve impairment to extremity: change in color, persistent  numbness, tingling, coldness or increase pain · Any questions What to do at Home: 
REGULAR DIET 
OK TO SHOWER 
ICE TO INCISION  
RETURN TO OFFICE AS SCHEDULED If you experience any of the following symptoms heavy bleeding, fevers, severe pain, please follow up with dr Geremias Zimmerman *  Please give a list of your current medications to your Primary Care Provider.  
 
*  Please update this list whenever your medications are discontinued, doses are 
    changed, or new medications (including over-the-counter products) are added. *  Please carry medication information at all times in case of emergency situations. These are general instructions for a healthy lifestyle: No smoking/ No tobacco products/ Avoid exposure to second hand smoke Surgeon General's Warning:  Quitting smoking now greatly reduces serious risk to your health. Obesity, smoking, and sedentary lifestyle greatly increases your risk for illness A healthy diet, regular physical exercise & weight monitoring are important for maintaining a healthy lifestyle You may be retaining fluid if you have a history of heart failure or if you experience any of the following symptoms:  Weight gain of 3 pounds or more overnight or 5 pounds in a week, increased swelling in our hands or feet or shortness of breath while lying flat in bed. Please call your doctor as soon as you notice any of these symptoms; do not wait until your next office visit. Recognize signs and symptoms of STROKE: 
 
F-face looks uneven A-arms unable to move or move unevenly S-speech slurred or non-existent T-time-call 911 as soon as signs and symptoms begin-DO NOT go Back to bed or wait to see if you get better-TIME IS BRAIN. Warning Signs of HEART ATTACK Call 911 if you have these symptoms: 
? Chest discomfort. Most heart attacks involve discomfort in the center of the chest that lasts more than a few minutes, or that goes away and comes back. It can feel like uncomfortable pressure, squeezing, fullness, or pain. ? Discomfort in other areas of the upper body. Symptoms can include pain or discomfort in one or both arms, the back, neck, jaw, or stomach. ? Shortness of breath with or without chest discomfort. ? Other signs may include breaking out in a cold sweat, nausea, or lightheadedness. Don't wait more than five minutes to call [address]!  Fast action can save your life. Calling 911 is almost always the fastest way to get lifesaving treatment. Emergency Medical Services staff can begin treatment when they arrive  up to an hour sooner than if someone gets to the hospital by car. The discharge information has been reviewed with the patient and caregiver. The patient and caregiver verbalized understanding. Discharge medications reviewed with the patient and caregiver and appropriate educational materials and side effects teaching were provided. Patient armband removed and shredded 
___________________________________________________________________________________________________________________________________ Introducing [facility]! Hermila Douglas introduces Last Guide patient portal. Now you can access parts of your medical record, email your doctor's office, and request medication refills online. 1. In your internet browser, go to [email or web address] 2. Click on the First Time User? Click Here link in the Sign In box. You will see the New Member Sign Up page. 3. Enter your Last Guide Access Code exactly as it appears below. You will not need to use this code after youve completed the sign-up process. If you do not sign up before the expiration date, you must request a new code. · Last Guide Access Code: [email or web address] Expires: 9/19/2018 11:12 AM 
 
4. Enter the last four digits of your Social Security Number (xxxx) and Date of Birth (mm/dd/yyyy) as indicated and click Submit. You will be taken to the next sign-up page. 5. Create a Last Guide ID. This will be your Last Guide login ID and cannot be changed, so think of one that is secure and easy to remember. 6. Create a Lendinot password. You can change your password at any time. 7. Enter your Password Reset Question and Answer. This can be used at a later time if you forget your password. 8. Enter your e-mail address.  You will receive e-mail notification when new information is available in [employer]. 9. Click Sign Up. You can now view and download portions of your medical record. 10. Click the Download Summary menu link to download a portable copy of your medical information. If you have questions, please visit the Frequently Asked Questions section of the Matchpointt website. Remember, [employer] is NOT to be used for urgent needs. For medical emergencies, dial 911. Now available from your iPhone and Android! Introducing Lucien Schuster As a [facility] patient, I wanted to make you aware of our electronic visit tool called Lucien Schuster. theAudience/7 allows you to connect within minutes with a medical provider 24 hours a day, seven days a week via a mobile device or tablet or logging into a secure website from your computer. You can access Lucien Schuster from anywhere in the [place]. A virtual visit might be right for you when you have a simple condition and feel like you just dont want to get out of bed, or cant get away from work for an appointment, when your regular [facility] provider is not available (evenings, weekends or holidays), or when youre out of town and need minor care. Electronic visits cost only $49 and if the theAudience/[employer] provider determines a prescription is needed to treat your condition, one can be electronically transmitted to a nearby pharmacy*. Please take a moment to enroll today if you have not already done so. The enrollment process is free and takes just a few minutes. To enroll, please download the [place] [employer] amna to your tablet or phone, or visit [email or web address] to enroll on your computer. And, as an [address] [place] [address] patient with a AdRoll account, the results of your visits will be scanned into your electronic medical record and your primary care provider will be able to view the scanned results. We urge you to continue to see your regular [employer] provider for your ongoing medical care. And while your primary care provider may not be the one available when you seek a Lucien Sextonkelsifin virtual visit, the peace of mind you get from getting a real diagnosis real time can be priceless. For more information on Lucien JDCPhosphatekelsifin, view our Frequently Asked Questions (FAQs) at [email or web address]. Sincerely, 
 
Klarissa Dumont MD 
Chief Medical Officer [facility] Adrianne Rueda *:  certain medications cannot be prescribed via [employer] Ignition[employer]redo Providers Seen During Your Hospitalization Provider Specialty Primary office phone Alfonso Campuzano MD Gynecologic Oncology [phone] Your Primary Care Physician (PCP) Primary Care Physician Office Phone Office Fax UNKNOWN, PROVIDER ** None ** ** None ** You are allergic to the following No active allergies Recent Documentation Height Weight BMI OB Status Smoking Status 1.524 m 43.3 kg 18.66 kg/m2 Unknown Never Smoker Emergency Contacts Name Discharge Info Relation Home Work Mobile [address] DISCHARGE CAREGIVER [3] Spouse [3]   [phone] Patient Belongings The following personal items are in your possession at time of discharge: 
  Dental Appliances: None  Visual Aid: Glasses, At home      Home Medications: None   Jewelry: None  Clothing: Footwear, Undergarments, Shirt, Pants (with )    Other Valuables: Dina Ket, Cell Phone (with ) Please provide this summary of care documentation to your next provider. Signatures-by signing, you are acknowledging that this After Visit Summary has been reviewed with you and you have received a copy. Patient Signature:  ____________________________________________________________  Date:  ____________________________________________________________  
  
Yovanny Beyer    
    
 Provider Signature:  ____________________________________________________________ Date:  ____________________________________________________________

## 2018-07-26 NOTE — DISCHARGE INSTRUCTIONS
DISCHARGE SUMMARY from Nurse    PATIENT INSTRUCTIONS:    After general anesthesia or intravenous sedation, for 24 hours or while taking prescription Narcotics:  · Limit your activities  · Do not drive and operate hazardous machinery  · Do not make important personal or business decisions  · Do  not drink alcoholic beverages  · If you have not urinated within 8 hours after discharge, please contact your surgeon on call. Report the following to your surgeon:  · Excessive pain, swelling, redness or odor of or around the surgical area  · Temperature over 100.5  · Nausea and vomiting lasting longer than 4 hours or if unable to take medications  · Any signs of decreased circulation or nerve impairment to extremity: change in color, persistent  numbness, tingling, coldness or increase pain  · Any questions    What to do at Home:  [address]    If you experience any of the following symptoms heavy bleeding, fevers, severe pain, please follow up with dr Flavio Anton    *  Please give a list of your current medications to your Primary Care Provider. *  Please update this list whenever your medications are discontinued, doses are      changed, or new medications (including over-the-counter products) are added. *  Please carry medication information at all times in case of emergency situations. These are general instructions for a healthy lifestyle:    No smoking/ No tobacco products/ Avoid exposure to second hand smoke  Surgeon General's Warning:  Quitting smoking now greatly reduces serious risk to your health.     Obesity, smoking, and sedentary lifestyle greatly increases your risk for illness    A healthy diet, regular physical exercise & weight monitoring are important for maintaining a healthy lifestyle    You may be retaining fluid if you have a history of heart failure or if you experience any of the following symptoms:  Weight gain of 3 pounds or more overnight or 5 pounds in a week, increased swelling in our hands or feet or shortness of breath while lying flat in bed. Please call your doctor as soon as you notice any of these symptoms; do not wait until your next office visit. Recognize signs and symptoms of STROKE:    F-face looks uneven    A-arms unable to move or move unevenly    S-speech slurred or non-existent    T-time-call 911 as soon as signs and symptoms begin-DO NOT go       Back to bed or wait to see if you get better-TIME IS BRAIN. Warning Signs of HEART ATTACK     Call 911 if you have these symptoms:   Chest discomfort. Most heart attacks involve discomfort in the center of the chest that lasts more than a few minutes, or that goes away and comes back. It can feel like uncomfortable pressure, squeezing, fullness, or pain.  Discomfort in other areas of the upper body. Symptoms can include pain or discomfort in one or both arms, the back, neck, jaw, or stomach.  Shortness of breath with or without chest discomfort.  Other signs may include breaking out in a cold sweat, nausea, or lightheadedness. Don't wait more than five minutes to call 911 - MINUTES MATTER! Fast action can save your life. Calling 911 is almost always the fastest way to get lifesaving treatment. Emergency Medical Services staff can begin treatment when they arrive -- up to an hour sooner than if someone gets to the hospital by car. The discharge information has been reviewed with the patient and caregiver. The patient and caregiver verbalized understanding. Discharge medications reviewed with the patient and caregiver and appropriate educational materials and side effects teaching were provided.     Patient armband removed and shredded  ___________________________________________________________________________________________________________________________________

## 2018-07-26 NOTE — OP NOTES
OPERATION     LOCATION OF PATIENT:  [facility]    DATE OF OPERATION:  7/26/2018    PREOPERATIVE DIAGNOSIS:  1) Squamous Cell Carcinoma of the Cervix  2) Completed primary chemotherapy and radiation therapy  3) Desires removal of Intravenous Mediport    POSTOPERATIVE DIAGNOSIS:  1) Squamous Cell Carcinoma of the Cervix  2) Completed primary chemotherapy and radiation therapy  3) Desires removal of Intravenous Mediport    OPERATION PERFORMED:  IV Mediport Removal (45394)     SURGEON:  Megan John MD    ASSISTANT SURGEON:  Noemy Sawyer SA    ANESTHESIA:  Monitored Anesthesia Care and Local with 0.5% Marcaine with epi    ANESTHESIOLOGIST:  Anesthesiologist: Mickie Adrian MD  CRNA: Tanesha Hawkins CRNA    FINDINGS:  Normal chest wall anatomy. Mediport removed intact with complete catheter attached. ESTIMATED BLOOD LOSS:  Minimal    PACKS AND DRAINS:  None    COMPLICATIONS:  None    INDICATIONS:  Jim Bassett is a very pleasant, 51 y.o.  female with Stage IIIB Squamous cell carcinoma of the cervix diagnosed  9/26/2017. The patient is status post primary therapy with definitive chemoradiation. She has completed treatment and desires removal of her IV mediport. SPECIMENS: none    DESCRIPTION OF PROCEDURE:   After patient was accurately identified, consent was verified, signed on the chart and all questions were answered. The patient was taken to the operating room. She was placed in the supine position with arms tucked. Monitored anesthesia care with sedation was used. The neck and chest was prepped in a normal fashion using Betadine. 0.5%  Marcaine with epinephrine was injected into the skin at the previous incision. The scalpel was used to re-incise. Bovie cautery was used to incise the subcutaneous tissue to expose the mediport. The catheter was visualized, grasped with a hemostat and removed from the subclavian vein intact.   Traction was placed on the port and each of the three 2-0 prolene sutures were entirely removed. The port was extracted and examined and verified to be intact. The port pocket was closed with 3-0 vicryl suture. The skin was reapproximated with 4-0 Monocryl. Dermabond was placed as a dressing. All sharp instruments and sponge counts were correct. There were no complication with this procedure. The patient tolerated the procedure very well.     Andrew Jennings MD  Gynecologic Oncology  07/26/18  12:08 PM

## 2018-07-26 NOTE — PERIOP NOTES
Family informed nurse that pt was planning on driving everyone home today.  Family instructed that pt is not to be driving for 24 hrs, daughter stated she would be driving

## 2018-07-26 NOTE — ANESTHESIA POSTPROCEDURE EVALUATION
Post-Anesthesia Evaluation & Assessment    Visit Vitals    BP 94/55    Pulse 84    Temp 36.4 °C (97.5 °F)    Resp 8    Ht 5' (1.524 m)    Wt 43.3 kg (95 lb 9 oz)    SpO2 100%    BMI 18.66 kg/m2       Nausea/Vomiting: no nausea    Post-operative hydration adequate. Pain score (VAS): 2    Mental status & Level of consciousness: alert and oriented x 3    Neurological status: moves all extremities, sensation grossly intact    Pulmonary status: airway patent, no supplemental oxygen required    Complications related to anesthesia: none    Patient has met all discharge requirements.     Additional comments:        Taylor Friedman MD

## 2018-07-26 NOTE — ANESTHESIA PREPROCEDURE EVALUATION
Anesthetic History No history of anesthetic complications Review of Systems / Medical History Patient summary reviewed, nursing notes reviewed and pertinent labs reviewed Pulmonary Neuro/Psych Within defined limits Cardiovascular Exercise tolerance: >4 METS 
  
GI/Hepatic/Renal 
Within defined limits Endo/Other Within defined limits Other Findings Physical Exam 
 
Airway Mallampati: II 
TM Distance: 4 - 6 cm Neck ROM: normal range of motion Mouth opening: Normal 
 
 Cardiovascular Regular rate and rhythm,  S1 and S2 normal,  no murmur, click, rub, or gallop Dental 
 
 
  
Pulmonary Breath sounds clear to auscultation Abdominal 
Abdominal exam normal 
 
 
 Other Findings Anesthetic Plan ASA: 2 Anesthesia type: MAC Induction: Intravenous Anesthetic plan and risks discussed with: Patient

## 2019-03-26 NOTE — ED TRIAGE NOTES
Vaginal bleeding for weeks, seen at GYN doctor on 9/19, came in tonight because bleeding is worse, given medication and told to return to 9/29  used

## 2019-04-29 NOTE — PERIOP NOTES
Assumed care and bedside report from [address] Pt A&O x4 Family at bedside in PACU Dressing: dry sterile dressing

## 2021-01-13 ENCOUNTER — TRANSCRIBE ORDER (OUTPATIENT)
Dept: SCHEDULING | Age: 51
End: 2021-01-13

## 2021-01-13 DIAGNOSIS — C53.0 MALIGNANT NEOPLASM OF ENDOCERVIX (HCC): Primary | ICD-10-CM

## 2021-02-03 ENCOUNTER — HOSPITAL ENCOUNTER (OUTPATIENT)
Dept: PET IMAGING | Age: 51
Discharge: HOME | End: 2021-02-03
Attending: OBSTETRICS & GYNECOLOGY
Payer: COMMERCIAL

## 2021-02-03 DIAGNOSIS — C53.0 MALIGNANT NEOPLASM OF ENDOCERVIX (HCC): ICD-10-CM

## 2021-02-03 PROCEDURE — A9552 F18 FDG: HCPCS

## (undated) DEVICE — AGENT HEMSTAT W6XL9IN OXIDIZED REGENERATED CELOS ABSRB FOR

## (undated) DEVICE — SUTURE VCRL SZ 3-0 L27IN ABSRB UD L26MM SH 1/2 CIR J416H

## (undated) DEVICE — Device: Brand: BALL ELECTRODES

## (undated) DEVICE — SPONGE GZ W4XL4IN COT 12 PLY TYP VII WVN C FLD DSGN

## (undated) DEVICE — Device

## (undated) DEVICE — INTENDED FOR TISSUE SEPARATION, AND OTHER PROCEDURES THAT REQUIRE A SHARP SURGICAL BLADE TO PUNCTURE OR CUT.: Brand: BARD-PARKER ® DISPOSABLE SCALPELS

## (undated) DEVICE — MINOR: Brand: MEDLINE INDUSTRIES, INC.

## (undated) DEVICE — SOL IRRIGATION INJ NACL 0.9% 500ML BTL

## (undated) DEVICE — DRAPE,THYROID,SOFT,STERILE: Brand: MEDLINE

## (undated) DEVICE — D&C HYSTEROSCOPY: Brand: MEDLINE INDUSTRIES, INC.

## (undated) DEVICE — 4-PORT MANIFOLD: Brand: NEPTUNE 2

## (undated) DEVICE — SUT VCRL + 1 36IN CT1 VIO --

## (undated) DEVICE — SOLUTION IRRIGATION H2O 0797305] ICU MEDICAL INC]

## (undated) DEVICE — GOWN,AURORA,NONRNF,XL,30/CS: Brand: MEDLINE

## (undated) DEVICE — CYSTO/BLADDER IRRIGATION SET, REGULATING CLAMP

## (undated) DEVICE — MAJOR LITHOTOMY: Brand: MEDLINE INDUSTRIES, INC.

## (undated) DEVICE — REM POLYHESIVE ADULT PATIENT RETURN ELECTRODE: Brand: VALLEYLAB

## (undated) DEVICE — NEEDLE HYPO 22GA L1 1/2IN PIVOTING SHLD FOR LUERLOCK SYR

## (undated) DEVICE — SUT SLK 2-0 18IN FS BLK --

## (undated) DEVICE — APPLICATOR BNDG 1MM ADH PREMIERPRO EXOFIN

## (undated) DEVICE — FLOSEAL MATRIX IS INDICATED IN SURGICAL PROCEDURES (OTHER THAN IN OPHTHALMIC) AS AN ADJUNCT TO HEMOSTASIS WHEN CONTROL OF BLEEDING BY LIGATURE OR CONVENTIONAL PROCEDURES IS INEFFECTIVE OR IMPRACTICAL.: Brand: FLOSEAL HEMOSTATIC MATRIX

## (undated) DEVICE — CATH URETH INTMIT ROB 16FR FUN -- CONVERT TO ITEM 179520

## (undated) DEVICE — ELECTRODE ELECSURG SQ LOOP 10X10 MM STRL DISP

## (undated) DEVICE — X-RAY SPONGES,12 PLY: Brand: DERMACEA

## (undated) DEVICE — APPLICATOR COT-TIP 6IN WOOD -- 2/PK STRL

## (undated) DEVICE — SYR 10ML CTRL LR LCK NSAF LF --

## (undated) DEVICE — AGENT HEMSTAT W4XL4IN OXIDIZED REGENERATED CELOS STRUCTURED

## (undated) DEVICE — TIP CLEANER: Brand: VALLEYLAB

## (undated) DEVICE — TRAY PREP DRY W/ PREM GLV 2 APPL 6 SPNG 2 UNDPD 1 OVERWRAP

## (undated) DEVICE — SUT MONOCRYL PLUS UD 4-0 --

## (undated) DEVICE — SUT PROL 2-0 30IN CT1 BLU --

## (undated) DEVICE — KENDALL SCD EXPRESS SLEEVES, KNEE LENGTH, MEDIUM: Brand: KENDALL SCD